# Patient Record
Sex: FEMALE | Race: OTHER | NOT HISPANIC OR LATINO
[De-identification: names, ages, dates, MRNs, and addresses within clinical notes are randomized per-mention and may not be internally consistent; named-entity substitution may affect disease eponyms.]

---

## 2018-08-30 ENCOUNTER — RESULT REVIEW (OUTPATIENT)
Age: 68
End: 2018-08-30

## 2019-06-05 ENCOUNTER — EMERGENCY (EMERGENCY)
Facility: HOSPITAL | Age: 69
LOS: 1 days | Discharge: HOME | End: 2019-06-05
Attending: EMERGENCY MEDICINE
Payer: MEDICARE

## 2019-06-05 VITALS
DIASTOLIC BLOOD PRESSURE: 74 MMHG | SYSTOLIC BLOOD PRESSURE: 125 MMHG | TEMPERATURE: 98 F | RESPIRATION RATE: 16 BRPM | HEART RATE: 60 BPM | OXYGEN SATURATION: 98 %

## 2019-06-05 VITALS
HEIGHT: 60 IN | DIASTOLIC BLOOD PRESSURE: 77 MMHG | WEIGHT: 104.06 LBS | HEART RATE: 75 BPM | SYSTOLIC BLOOD PRESSURE: 123 MMHG | OXYGEN SATURATION: 98 % | TEMPERATURE: 98 F | RESPIRATION RATE: 16 BRPM

## 2019-06-05 LAB
ALBUMIN SERPL ELPH-MCNC: 4.2 G/DL — SIGNIFICANT CHANGE UP (ref 3.3–5)
ALP SERPL-CCNC: 86 U/L — SIGNIFICANT CHANGE UP (ref 40–120)
ALT FLD-CCNC: 36 U/L — SIGNIFICANT CHANGE UP (ref 10–45)
ANION GAP SERPL CALC-SCNC: 11 MMOL/L — SIGNIFICANT CHANGE UP (ref 5–17)
AST SERPL-CCNC: 50 U/L — HIGH (ref 10–40)
BILIRUB SERPL-MCNC: 0.3 MG/DL — SIGNIFICANT CHANGE UP (ref 0.2–1.2)
BUN SERPL-MCNC: 15 MG/DL — SIGNIFICANT CHANGE UP (ref 7–23)
CALCIUM SERPL-MCNC: 9.4 MG/DL — SIGNIFICANT CHANGE UP (ref 8.4–10.5)
CHLORIDE SERPL-SCNC: 103 MMOL/L — SIGNIFICANT CHANGE UP (ref 96–108)
CO2 SERPL-SCNC: 25 MMOL/L — SIGNIFICANT CHANGE UP (ref 22–31)
CREAT SERPL-MCNC: 0.48 MG/DL — LOW (ref 0.5–1.3)
GLUCOSE SERPL-MCNC: 84 MG/DL — SIGNIFICANT CHANGE UP (ref 70–99)
HCT VFR BLD CALC: 35 % — SIGNIFICANT CHANGE UP (ref 34.5–45)
HGB BLD-MCNC: 11.3 G/DL — LOW (ref 11.5–15.5)
LIDOCAIN IGE QN: 38 U/L — SIGNIFICANT CHANGE UP (ref 7–60)
MCHC RBC-ENTMCNC: 29.3 PG — SIGNIFICANT CHANGE UP (ref 27–34)
MCHC RBC-ENTMCNC: 32.3 GM/DL — SIGNIFICANT CHANGE UP (ref 32–36)
MCV RBC AUTO: 90.8 FL — SIGNIFICANT CHANGE UP (ref 80–100)
PLATELET # BLD AUTO: 328 K/UL — SIGNIFICANT CHANGE UP (ref 150–400)
POTASSIUM SERPL-MCNC: 6.5 MMOL/L — CRITICAL HIGH (ref 3.5–5.3)
POTASSIUM SERPL-SCNC: 6.5 MMOL/L — CRITICAL HIGH (ref 3.5–5.3)
PROT SERPL-MCNC: 7.9 G/DL — SIGNIFICANT CHANGE UP (ref 6–8.3)
RBC # BLD: 3.86 M/UL — SIGNIFICANT CHANGE UP (ref 3.8–5.2)
RBC # FLD: 14.3 % — SIGNIFICANT CHANGE UP (ref 10.3–14.5)
SODIUM SERPL-SCNC: 139 MMOL/L — SIGNIFICANT CHANGE UP (ref 135–145)
WBC # BLD: 8.7 K/UL — SIGNIFICANT CHANGE UP (ref 3.8–10.5)
WBC # FLD AUTO: 8.7 K/UL — SIGNIFICANT CHANGE UP (ref 3.8–10.5)

## 2019-06-05 PROCEDURE — 71275 CT ANGIOGRAPHY CHEST: CPT | Mod: 26

## 2019-06-05 PROCEDURE — 96374 THER/PROPH/DIAG INJ IV PUSH: CPT

## 2019-06-05 PROCEDURE — 99284 EMERGENCY DEPT VISIT MOD MDM: CPT | Mod: 25

## 2019-06-05 PROCEDURE — 71045 X-RAY EXAM CHEST 1 VIEW: CPT | Mod: 26

## 2019-06-05 PROCEDURE — 99284 EMERGENCY DEPT VISIT MOD MDM: CPT

## 2019-06-05 PROCEDURE — 72100 X-RAY EXAM L-S SPINE 2/3 VWS: CPT

## 2019-06-05 PROCEDURE — 72100 X-RAY EXAM L-S SPINE 2/3 VWS: CPT | Mod: 26

## 2019-06-05 PROCEDURE — 83690 ASSAY OF LIPASE: CPT

## 2019-06-05 PROCEDURE — 71275 CT ANGIOGRAPHY CHEST: CPT

## 2019-06-05 PROCEDURE — 71045 X-RAY EXAM CHEST 1 VIEW: CPT

## 2019-06-05 PROCEDURE — 74174 CTA ABD&PLVS W/CONTRAST: CPT

## 2019-06-05 PROCEDURE — 74174 CTA ABD&PLVS W/CONTRAST: CPT | Mod: 26

## 2019-06-05 PROCEDURE — 85027 COMPLETE CBC AUTOMATED: CPT

## 2019-06-05 PROCEDURE — 80053 COMPREHEN METABOLIC PANEL: CPT

## 2019-06-05 RX ORDER — OXYCODONE HYDROCHLORIDE 5 MG/1
5 TABLET ORAL ONCE
Refills: 0 | Status: DISCONTINUED | OUTPATIENT
Start: 2019-06-05 | End: 2019-06-05

## 2019-06-05 RX ORDER — OXYCODONE HYDROCHLORIDE 5 MG/1
1 TABLET ORAL
Qty: 12 | Refills: 0
Start: 2019-06-05 | End: 2019-06-08

## 2019-06-05 RX ORDER — ACETAMINOPHEN 500 MG
975 TABLET ORAL ONCE
Refills: 0 | Status: COMPLETED | OUTPATIENT
Start: 2019-06-05 | End: 2019-06-05

## 2019-06-05 RX ORDER — KETOROLAC TROMETHAMINE 30 MG/ML
15 SYRINGE (ML) INJECTION ONCE
Refills: 0 | Status: DISCONTINUED | OUTPATIENT
Start: 2019-06-05 | End: 2019-06-05

## 2019-06-05 RX ADMIN — Medication 975 MILLIGRAM(S): at 16:57

## 2019-06-05 RX ADMIN — Medication 15 MILLIGRAM(S): at 16:56

## 2019-06-05 RX ADMIN — OXYCODONE HYDROCHLORIDE 5 MILLIGRAM(S): 5 TABLET ORAL at 16:56

## 2019-06-05 NOTE — ED PROVIDER NOTE - CLINICAL SUMMARY MEDICAL DECISION MAKING FREE TEXT BOX
4 days of back pain worsening today- feeling better after medicated  requesting to dc home 4 days of back pain worsening today- feeling better after medicated  requesting to dc home    Attending Statement: Agree with the above.  LBP after straining/twisting.  Pain reproducible but TTP at LUQ/?epigastrium? as well as -- and more pronounced at -- L sided lumbar/thoracic paraspinal musculature.  Likely MSK strain, likely QL, given reproducibility of symptoms but given age and comorbidities as well as degree of pain will require labs, UA, and plain films to eval for intra-abdominal/thoracic pathology.  Pain rx.  S/O to Dr. Mann pending w/u and reassess.  --BMM

## 2019-06-05 NOTE — ED PROVIDER NOTE - OBJECTIVE STATEMENT
69 yo female accompanied by family for evaluation  of lower back pain for the past 4 days, worsening today. Pt reports 8/10 dull pain at this time not relieved by her prescribed meloxicam. Pt denies numbness, tingling, weakness, loss of bowel or bladder function or any other concerns. Pt denies chest pains or shortness of breath or palpitations. Pt states "I was cleaning over the weekend and I bent over and hurt my back. I did not fall or have other injuries".

## 2019-06-05 NOTE — ED ADULT NURSE NOTE - OBJECTIVE STATEMENT
68y female arrived to ED complaining of back pain. Patient reports onset of pain began 4 days ago and has worsened since then. Patient takes meloxicam without relief, endorses 10/10 pain on the side of her back upper and lower radiating to her anterior left side. Patient denies injury, burning urination, CP, SOB, n/v/d, ab pain, chills, fever. Patient A&Ox4, ambulates, VS stable. PMHx herniated disc and osteoporosis.

## 2019-06-05 NOTE — ED PROVIDER NOTE - SHIFT CHANGE DETAILS
Received sign-out from Dr. Fagan awaiting labs and XRs in anticipation of DC if unremarkable for any emergent abnormality and patient symptomatically improved. Labs and XRs completed. Labs unremarkable (K 6.5 but hemolyzed c/ a normal creatinine. Not in need of repeating.) Called by Radiology regarding XR finding. CTA ordered as recommended. Will follow the results, reassess and treat/dispo accordingly.

## 2019-06-05 NOTE — ED PROVIDER NOTE - NSFOLLOWUPINSTRUCTIONS_ED_ALL_ED_FT
REst, increase activity as toelrated  -- Please use 650mg Tylenol (also called acetaminophen) every 4 hours & 600mg Motrin (also called Advil or ibuprofen) every 6 hours as needed for pain/discomfort/swelling. You can get these without a prescription. Don't use more than 3500mg of Tylenol in any 24-hour period. Make sure your other prescription/over-the-counter medications don't contain any Tylenol so you don't take too much. If you have any stomach discomfort while taking Motrin, you can use TUMS or Pepcid or Zantac (these can all be bought without a prescription).   take oxycodone for breakthrough pain if other medications are not helping.   Apply over the counter lidocaine patch to back as needed - such as SALONPAS    follow up with the spine center

## 2019-06-05 NOTE — ED PROVIDER NOTE - CONSTITUTIONAL, MLM
FitKit was given to patient on 7/25/2018 11:01 AM          normal... Well appearing, well nourished, awake, alert, oriented to person, place, time/situation and in no apparent distress.

## 2019-06-05 NOTE — ED PROVIDER NOTE - PROGRESS NOTE DETAILS
Pt feeling much better at this time. requesting to dc home - pt to follow up with spine  motrin tylenol and oxycodone to dc home

## 2019-06-05 NOTE — ED PROVIDER NOTE - CARE PLAN
Principal Discharge DX:	Back strain, initial encounter Principal Discharge DX:	Back strain, initial encounter  Goal:	L, lower

## 2019-06-25 ENCOUNTER — APPOINTMENT (OUTPATIENT)
Dept: ORTHOPEDIC SURGERY | Facility: CLINIC | Age: 69
End: 2019-06-25
Payer: MEDICARE

## 2019-06-25 VITALS
HEART RATE: 67 BPM | HEIGHT: 60 IN | WEIGHT: 104 LBS | SYSTOLIC BLOOD PRESSURE: 138 MMHG | BODY MASS INDEX: 20.42 KG/M2 | DIASTOLIC BLOOD PRESSURE: 74 MMHG

## 2019-06-25 DIAGNOSIS — Z78.9 OTHER SPECIFIED HEALTH STATUS: ICD-10-CM

## 2019-06-25 PROCEDURE — 72100 X-RAY EXAM L-S SPINE 2/3 VWS: CPT

## 2019-06-25 PROCEDURE — 99204 OFFICE O/P NEW MOD 45 MIN: CPT

## 2019-07-10 ENCOUNTER — APPOINTMENT (OUTPATIENT)
Dept: NEUROSURGERY | Facility: CLINIC | Age: 69
End: 2019-07-10
Payer: MEDICARE

## 2019-07-10 VITALS
BODY MASS INDEX: 20.42 KG/M2 | HEART RATE: 69 BPM | SYSTOLIC BLOOD PRESSURE: 104 MMHG | HEIGHT: 60 IN | WEIGHT: 104 LBS | DIASTOLIC BLOOD PRESSURE: 62 MMHG

## 2019-07-10 PROCEDURE — 99203 OFFICE O/P NEW LOW 30 MIN: CPT

## 2019-07-10 RX ORDER — MULTIVITAMIN
CAPSULE ORAL
Refills: 0 | Status: ACTIVE | COMMUNITY

## 2019-07-10 RX ORDER — ASPIRIN 81 MG
81 TABLET, DELAYED RELEASE (ENTERIC COATED) ORAL
Refills: 0 | Status: ACTIVE | COMMUNITY

## 2019-07-10 RX ORDER — ATORVASTATIN CALCIUM 10 MG/1
10 TABLET, FILM COATED ORAL DAILY
Refills: 0 | Status: ACTIVE | COMMUNITY

## 2019-07-10 RX ORDER — GABAPENTIN 300 MG/1
300 CAPSULE ORAL 3 TIMES DAILY
Refills: 0 | Status: ACTIVE | COMMUNITY

## 2019-07-10 RX ORDER — MELOXICAM 7.5 MG/1
7.5 TABLET ORAL DAILY
Refills: 0 | Status: ACTIVE | COMMUNITY

## 2019-07-10 RX ORDER — AMLODIPINE BESYLATE 2.5 MG/1
2.5 TABLET ORAL DAILY
Refills: 0 | Status: ACTIVE | COMMUNITY

## 2019-07-10 RX ORDER — ALENDRONATE SODIUM 70 MG/1
70 TABLET ORAL
Refills: 0 | Status: ACTIVE | COMMUNITY

## 2019-07-10 RX ORDER — CHROMIUM 200 MCG
TABLET ORAL
Refills: 0 | Status: ACTIVE | COMMUNITY

## 2019-07-10 NOTE — DATA REVIEWED
[de-identified] : MRI lumbar spine done 2/13/19 showed evidence of L5-S1 herniation with grade 1 spondy and bilateral L5 and S1 imipingement, bilateral L4 impingement from DDD L4-L5

## 2019-07-10 NOTE — ASSESSMENT
[FreeTextEntry1] : 68 year old female with low back and lumbar radicular pain secondary to disc herniation.  Given the nature of the patient's complaints and lack of significant improvement following more conservative measures, we did discuss lumbar epidural steroid injection.  Risks, benefits, and expectations of the procedure were reviewed.  The patient was provided with an educational pamphlet outlining the details of the procedure so that he/she may have the ability to review the information prior to proceeding.  The patient has agreed to proceed and will follow up with me for the procedure.\par

## 2019-07-10 NOTE — HISTORY OF PRESENT ILLNESS
[___ mths] : [unfilled] month(s) ago [Back] : back [8] : a maximum pain level of 8/10 [Sharp] : sharp [Right] : right [Ankle] : ankle [Anterior] : anterior aspect of the [Sitting] : sitting [Numbness] : numbness [Standing] : standing [Laying] : laying [Walking] : walking [Insomnia] : insomnia [Medications] : medications [PT] : PT [Gait Dysfunction] : gait dysfunction [FreeTextEntry2] : right leg [FreeTextEntry6] : Gabapenin, Meloxicam

## 2019-07-10 NOTE — PHYSICAL EXAM
[General Appearance - Alert] : alert [Mood] : the mood was normal [4] : L3 quadriceps 4/5 [Sensation Tactile Decrease] : light touch was intact [5] : S1 ankle flexors 5/5 [2+] : Ankle jerk left 2+ [Straight-Leg Raise Test - Right] : straight leg raise of the right leg was positive [Straight-Leg Raise Test - Left] : straight leg raise of the left leg was positive [Able to toe walk] : the patient was not able to toe walk [Able to heel walk] : the patient was able to heel walk [FreeTextEntry1] : tenderness to palpation over right lumbar paraspinals and right PSIS [No Spinal Tenderness] : no spinal tenderness [] : no rash

## 2019-07-21 ENCOUNTER — TRANSCRIPTION ENCOUNTER (OUTPATIENT)
Age: 69
End: 2019-07-21

## 2019-07-22 ENCOUNTER — APPOINTMENT (OUTPATIENT)
Dept: NEUROSURGERY | Facility: CLINIC | Age: 69
End: 2019-07-22
Payer: MEDICARE

## 2019-07-22 ENCOUNTER — OUTPATIENT (OUTPATIENT)
Dept: OUTPATIENT SERVICES | Facility: HOSPITAL | Age: 69
LOS: 1 days | End: 2019-07-22
Payer: MEDICARE

## 2019-07-22 DIAGNOSIS — M54.16 RADICULOPATHY, LUMBAR REGION: ICD-10-CM

## 2019-07-22 PROCEDURE — 62323 NJX INTERLAMINAR LMBR/SAC: CPT

## 2019-08-07 ENCOUNTER — APPOINTMENT (OUTPATIENT)
Dept: NEUROSURGERY | Facility: CLINIC | Age: 69
End: 2019-08-07

## 2019-11-07 ENCOUNTER — APPOINTMENT (OUTPATIENT)
Dept: ORTHOPEDIC SURGERY | Facility: CLINIC | Age: 69
End: 2019-11-07

## 2019-11-12 ENCOUNTER — APPOINTMENT (OUTPATIENT)
Dept: ORTHOPEDIC SURGERY | Facility: CLINIC | Age: 69
End: 2019-11-12
Payer: MEDICARE

## 2019-11-15 ENCOUNTER — APPOINTMENT (OUTPATIENT)
Dept: ORTHOPEDIC SURGERY | Facility: CLINIC | Age: 69
End: 2019-11-15
Payer: MEDICARE

## 2019-11-15 PROCEDURE — 99214 OFFICE O/P EST MOD 30 MIN: CPT

## 2019-11-22 ENCOUNTER — APPOINTMENT (OUTPATIENT)
Dept: NEUROSURGERY | Facility: CLINIC | Age: 69
End: 2019-11-22
Payer: MEDICARE

## 2019-11-22 VITALS
BODY MASS INDEX: 22.91 KG/M2 | HEIGHT: 55 IN | SYSTOLIC BLOOD PRESSURE: 125 MMHG | HEART RATE: 56 BPM | DIASTOLIC BLOOD PRESSURE: 76 MMHG | WEIGHT: 99 LBS

## 2019-11-22 PROCEDURE — 99213 OFFICE O/P EST LOW 20 MIN: CPT

## 2019-11-22 NOTE — REASON FOR VISIT
[Follow-Up: _____] : a [unfilled] follow-up visit [FreeTextEntry1] : Patient returns after having LESI in July.  She states it did help her a little.  She has seen Dr. Bennett who suggested a repeat injection.  She is here to discuss.

## 2019-11-22 NOTE — ASSESSMENT
[FreeTextEntry1] : 69 year old female with low back and lumbar radicular pain only minimally improved following her first injection in July.  She is agreeable to a repeat injection and will follow up with me next week for her procedure.

## 2019-12-01 ENCOUNTER — TRANSCRIPTION ENCOUNTER (OUTPATIENT)
Age: 69
End: 2019-12-01

## 2019-12-02 ENCOUNTER — OUTPATIENT (OUTPATIENT)
Dept: OUTPATIENT SERVICES | Facility: HOSPITAL | Age: 69
LOS: 1 days | End: 2019-12-02
Payer: MEDICARE

## 2019-12-02 ENCOUNTER — APPOINTMENT (OUTPATIENT)
Dept: NEUROSURGERY | Facility: CLINIC | Age: 69
End: 2019-12-02
Payer: MEDICARE

## 2019-12-02 DIAGNOSIS — M54.16 RADICULOPATHY, LUMBAR REGION: ICD-10-CM

## 2019-12-02 PROCEDURE — 62323 NJX INTERLAMINAR LMBR/SAC: CPT

## 2019-12-18 ENCOUNTER — APPOINTMENT (OUTPATIENT)
Dept: NEUROSURGERY | Facility: CLINIC | Age: 69
End: 2019-12-18
Payer: MEDICARE

## 2019-12-18 VITALS
HEIGHT: 55 IN | DIASTOLIC BLOOD PRESSURE: 68 MMHG | SYSTOLIC BLOOD PRESSURE: 119 MMHG | HEART RATE: 61 BPM | WEIGHT: 97 LBS | BODY MASS INDEX: 22.45 KG/M2

## 2019-12-18 PROCEDURE — 99213 OFFICE O/P EST LOW 20 MIN: CPT

## 2019-12-18 NOTE — ASSESSMENT
[FreeTextEntry1] : 69 year old female with low back and lumbar radicular pain now moderately improved following her second LESI this year.  She is agreeable to a third and final injection in this series and will follow up with me in the next couple of weeks for her procedure.

## 2019-12-18 NOTE — REASON FOR VISIT
[Follow-Up: _____] : a [unfilled] follow-up visit [FreeTextEntry1] : Patient returns after her second LESI a few weeks ago.  She states that she feels about 50% improved as compared to prior to her procedure.  She is here to discuss her progress.

## 2020-01-05 ENCOUNTER — TRANSCRIPTION ENCOUNTER (OUTPATIENT)
Age: 70
End: 2020-01-05

## 2020-01-06 ENCOUNTER — APPOINTMENT (OUTPATIENT)
Dept: NEUROSURGERY | Facility: CLINIC | Age: 70
End: 2020-01-06
Payer: MEDICARE

## 2020-01-06 ENCOUNTER — OUTPATIENT (OUTPATIENT)
Dept: OUTPATIENT SERVICES | Facility: HOSPITAL | Age: 70
LOS: 1 days | End: 2020-01-06
Payer: MEDICARE

## 2020-01-06 DIAGNOSIS — M54.16 RADICULOPATHY, LUMBAR REGION: ICD-10-CM

## 2020-01-06 PROCEDURE — 62323 NJX INTERLAMINAR LMBR/SAC: CPT

## 2020-01-29 ENCOUNTER — APPOINTMENT (OUTPATIENT)
Dept: NEUROSURGERY | Facility: CLINIC | Age: 70
End: 2020-01-29
Payer: MEDICARE

## 2020-01-29 VITALS
DIASTOLIC BLOOD PRESSURE: 84 MMHG | HEART RATE: 68 BPM | HEIGHT: 55 IN | BODY MASS INDEX: 22.68 KG/M2 | WEIGHT: 98 LBS | SYSTOLIC BLOOD PRESSURE: 126 MMHG

## 2020-01-29 PROCEDURE — 99213 OFFICE O/P EST LOW 20 MIN: CPT

## 2020-01-29 NOTE — ASSESSMENT
[FreeTextEntry1] : 69 year old female with low back and lumbar radicular pain now improved following a series of 3 LESIs.  She will continue with her home exercises and return to see me at the earliest sign that her pain worsens for further injection therapy as necessary.

## 2020-01-29 NOTE — REASON FOR VISIT
[Follow-Up: _____] : a [unfilled] follow-up visit [FreeTextEntry1] : Patient returns after her third and final epidural in this series.  She feels an overall 60% improved following this procedure.  Her current pain intensity is 4/10.  She takes Gabapentin and Meloxicam for pain relief.  She is here to discuss her progress.

## 2020-03-16 ENCOUNTER — INPATIENT (INPATIENT)
Facility: HOSPITAL | Age: 70
LOS: 0 days | Discharge: HOME | DRG: 312 | End: 2020-03-17
Attending: HOSPITALIST | Admitting: HOSPITALIST
Payer: COMMERCIAL

## 2020-03-16 VITALS
SYSTOLIC BLOOD PRESSURE: 123 MMHG | WEIGHT: 110.01 LBS | DIASTOLIC BLOOD PRESSURE: 70 MMHG | HEART RATE: 60 BPM | RESPIRATION RATE: 15 BRPM | TEMPERATURE: 99 F | OXYGEN SATURATION: 99 % | HEIGHT: 57 IN

## 2020-03-16 LAB
ALBUMIN SERPL ELPH-MCNC: 4.2 G/DL — SIGNIFICANT CHANGE UP (ref 3.3–5)
ALP SERPL-CCNC: 65 U/L — SIGNIFICANT CHANGE UP (ref 40–120)
ALT FLD-CCNC: 16 U/L — SIGNIFICANT CHANGE UP (ref 10–45)
ANION GAP SERPL CALC-SCNC: 15 MMOL/L — SIGNIFICANT CHANGE UP (ref 5–17)
APTT BLD: 30 SEC — SIGNIFICANT CHANGE UP (ref 27.5–36.3)
AST SERPL-CCNC: 27 U/L — SIGNIFICANT CHANGE UP (ref 10–40)
BASOPHILS # BLD AUTO: 0.02 K/UL — SIGNIFICANT CHANGE UP (ref 0–0.2)
BASOPHILS NFR BLD AUTO: 0.3 % — SIGNIFICANT CHANGE UP (ref 0–2)
BILIRUB SERPL-MCNC: 0.3 MG/DL — SIGNIFICANT CHANGE UP (ref 0.2–1.2)
BUN SERPL-MCNC: 11 MG/DL — SIGNIFICANT CHANGE UP (ref 7–23)
CALCIUM SERPL-MCNC: 9.3 MG/DL — SIGNIFICANT CHANGE UP (ref 8.4–10.5)
CHLORIDE SERPL-SCNC: 102 MMOL/L — SIGNIFICANT CHANGE UP (ref 96–108)
CO2 SERPL-SCNC: 24 MMOL/L — SIGNIFICANT CHANGE UP (ref 22–31)
CREAT SERPL-MCNC: 0.43 MG/DL — LOW (ref 0.5–1.3)
EOSINOPHIL # BLD AUTO: 0.1 K/UL — SIGNIFICANT CHANGE UP (ref 0–0.5)
EOSINOPHIL NFR BLD AUTO: 1.4 % — SIGNIFICANT CHANGE UP (ref 0–6)
GLUCOSE SERPL-MCNC: 87 MG/DL — SIGNIFICANT CHANGE UP (ref 70–99)
HCT VFR BLD CALC: 38.5 % — SIGNIFICANT CHANGE UP (ref 34.5–45)
HGB BLD-MCNC: 11.8 G/DL — SIGNIFICANT CHANGE UP (ref 11.5–15.5)
IMM GRANULOCYTES NFR BLD AUTO: 0.4 % — SIGNIFICANT CHANGE UP (ref 0–1.5)
INR BLD: 0.96 RATIO — SIGNIFICANT CHANGE UP (ref 0.88–1.16)
LYMPHOCYTES # BLD AUTO: 1.52 K/UL — SIGNIFICANT CHANGE UP (ref 1–3.3)
LYMPHOCYTES # BLD AUTO: 21.3 % — SIGNIFICANT CHANGE UP (ref 13–44)
MAGNESIUM SERPL-MCNC: 1.9 MG/DL — SIGNIFICANT CHANGE UP (ref 1.6–2.6)
MCHC RBC-ENTMCNC: 28.8 PG — SIGNIFICANT CHANGE UP (ref 27–34)
MCHC RBC-ENTMCNC: 30.6 GM/DL — LOW (ref 32–36)
MCV RBC AUTO: 93.9 FL — SIGNIFICANT CHANGE UP (ref 80–100)
MONOCYTES # BLD AUTO: 0.91 K/UL — HIGH (ref 0–0.9)
MONOCYTES NFR BLD AUTO: 12.7 % — SIGNIFICANT CHANGE UP (ref 2–14)
NEUTROPHILS # BLD AUTO: 4.56 K/UL — SIGNIFICANT CHANGE UP (ref 1.8–7.4)
NEUTROPHILS NFR BLD AUTO: 63.9 % — SIGNIFICANT CHANGE UP (ref 43–77)
NRBC # BLD: 0 /100 WBCS — SIGNIFICANT CHANGE UP (ref 0–0)
PHOSPHATE SERPL-MCNC: 4.3 MG/DL — SIGNIFICANT CHANGE UP (ref 2.5–4.5)
PLATELET # BLD AUTO: 292 K/UL — SIGNIFICANT CHANGE UP (ref 150–400)
POTASSIUM SERPL-MCNC: 4.6 MMOL/L — SIGNIFICANT CHANGE UP (ref 3.5–5.3)
POTASSIUM SERPL-SCNC: 4.6 MMOL/L — SIGNIFICANT CHANGE UP (ref 3.5–5.3)
PROT SERPL-MCNC: 8 G/DL — SIGNIFICANT CHANGE UP (ref 6–8.3)
PROTHROM AB SERPL-ACNC: 11 SEC — SIGNIFICANT CHANGE UP (ref 10–12.9)
RBC # BLD: 4.1 M/UL — SIGNIFICANT CHANGE UP (ref 3.8–5.2)
RBC # FLD: 14.1 % — SIGNIFICANT CHANGE UP (ref 10.3–14.5)
SODIUM SERPL-SCNC: 141 MMOL/L — SIGNIFICANT CHANGE UP (ref 135–145)
TROPONIN T, HIGH SENSITIVITY RESULT: 8 NG/L — SIGNIFICANT CHANGE UP (ref 0–51)
TROPONIN T, HIGH SENSITIVITY RESULT: 8 NG/L — SIGNIFICANT CHANGE UP (ref 0–51)
WBC # BLD: 7.14 K/UL — SIGNIFICANT CHANGE UP (ref 3.8–10.5)
WBC # FLD AUTO: 7.14 K/UL — SIGNIFICANT CHANGE UP (ref 3.8–10.5)

## 2020-03-16 PROCEDURE — 99220: CPT

## 2020-03-16 RX ORDER — ACETAMINOPHEN 500 MG
975 TABLET ORAL ONCE
Refills: 0 | Status: COMPLETED | OUTPATIENT
Start: 2020-03-16 | End: 2020-03-16

## 2020-03-16 RX ORDER — SODIUM CHLORIDE 9 MG/ML
3 INJECTION INTRAMUSCULAR; INTRAVENOUS; SUBCUTANEOUS EVERY 8 HOURS
Refills: 0 | Status: DISCONTINUED | OUTPATIENT
Start: 2020-03-16 | End: 2020-03-17

## 2020-03-16 RX ORDER — MECLIZINE HCL 12.5 MG
50 TABLET ORAL ONCE
Refills: 0 | Status: COMPLETED | OUTPATIENT
Start: 2020-03-16 | End: 2020-03-16

## 2020-03-16 RX ADMIN — Medication 50 MILLIGRAM(S): at 18:01

## 2020-03-16 RX ADMIN — Medication 975 MILLIGRAM(S): at 18:01

## 2020-03-16 NOTE — ED CDU PROVIDER INITIAL DAY NOTE - DETAILS
68 y/o female with no reported PMHx no presented to the ED c/o dizziness.  In ED, patient had CT head w/o contrast, CT angio head and neck showing no signs of acute pathology. Pt was evaluated by Neurology who recommended CDU for MRI brain. If MRI brain positive, can discharge on ASA/Plavix for 3 weeks followed by ASA alone. If negative, no further workup.

## 2020-03-16 NOTE — ED CDU PROVIDER INITIAL DAY NOTE - PROGRESS NOTE DETAILS
CDU PROGRESS NOTE PA JOSUÉ: Pt resting comfortably, NAD. No focal neuro deficits. (+) Orthostatic BP changes. c/d/w Dr. Mooney, will give bolus 1liter NS and closely follow.

## 2020-03-16 NOTE — CONSULT NOTE ADULT - ASSESSMENT
Impression: Isolated vertigo, likely peripheral. r/o CVA.     Plan:   [] CDU for MRI brain   [] If MRI brain positive, can discharge on ASA/Plavix for 3 weeks followed by ASA alone. If negative, no further workup.   [] F/u with Dr. Serjio Arnold at [phone] Impression: Isolated vertigo, likely peripheral. r/o CVA.     Plan:   [] CDU for MRI brain   [] If MRI brain positive for acute infarct, can discharge on ASA/Plavix for 3 weeks followed by ASA alone. Also can add atorvastatin 80mg. If negative, no need for antiplatelet or statin therapy.   [] F/u with Dr. Serjio Arnold at [phone] Impression: Isolated vertigo, likely peripheral. r/o CVA.     Plan:   [] CDU for MRI brain   [x] CTH, CTA h/n negative   [] If MRI brain positive for acute infarct, can discharge on ASA/Plavix for 3 weeks followed by ASA alone. Also can add atorvastatin 80mg. If negative, no need for antiplatelet or statin therapy.   [] F/u with Dr. Serjio Arnold at [phone]

## 2020-03-16 NOTE — CONSULT NOTE ADULT - SUBJECTIVE AND OBJECTIVE BOX
HPI:    69F w/ PMH presents w/ dizziness and room-spinning sensation since yesterday.     Patient states she got up to walk to her kitchen yesterday when she started feeling dizzy, stating she felt like the room was spinning and then lightheaded. This lead to a fall. No LOC. did not hit head. The dizziness has persisted. Continuos. Worse w/ sudden head movements. Denies changes in speech, vision, hearing, swallowing, voice quality, numbness/tingling, weakness, balance/room-spinning sensations. No previous history of CVA/TIA, seizures, migraines. No history of arrythmia. No AC.      (Stroke only)  NIHSS: 0    CTH: Negative  CTA h/n: Negative    A 10-system ROS was performed and is negative except for those items noted above and/or in the HPI.    PAST MEDICAL & SURGICAL HISTORY:  Diarrhea  Nausea & vomiting  Pancolitis  No significant past surgical history    FAMILY HISTORY:    SOCIAL HISTORY:   T/E/D: No smoke, drink, drugs     MEDICATIONS (HOME):  Home Medications:    Exam:   HIINT: No corrective saccade. No gaze evoked nystagmus. No skew.   MS: Alert and oriented to self, place and time. Attentive. Language fluent w/ intact comprehension and repetition. No extinction on double simultaneous stimulation   CN: VFF. GALDINO. EOMI. V1-V3 intact. Face symmetric. T/u midline. Normal shrug.   Motor: Normal tone. No drift. Full strength throughout  Reflexes: 3+ throughout. Babinski presents on the right, absent on the left.   Sensation: Intact to LT throughout   Coordination: No dysmetria on FNF or H2S bilaterally   Gait: Deferred     STUDIES & IMAGIN- Na 141  - K 4.6  -16 P 4.3  -16 Mg 1.9  -16 Ca 9.3  -16 Cr 0.43<L>

## 2020-03-16 NOTE — ED ADULT NURSE NOTE - OBJECTIVE STATEMENT
Pt presents to ED following a fall yesterday at home, AXOX3, pt reports walking to kitchen at home to get something to eat, and falling to floor, pt unsure how long she was down, reports

## 2020-03-16 NOTE — ED PROVIDER NOTE - OBJECTIVE STATEMENT
68 y/o female with no reported PMHx no PSx now presenting to the ED c/o dizziness with room spinning x1 day s/p unwitnessed syncopal event yesterday. Patient reported she syncopized onto a carpeted floor yesterday for unknown period of time. Patient has never syncopized in the past. Since the event pt has had diffuse headache and has not taken medication for pain as of yet. Patient feels unsteady on her feet requesting assistance to ambulate which is not her baseline. Patient denied CP, SOB, N/V/D, diarrhea, fever, cough, COVID sick contacts, abdominal pain, slurred speech, numbness, urinary/fecal incontinence, facial droop, weakness in extremity, previous h/o stroke, blurry vision

## 2020-03-16 NOTE — ED PROVIDER NOTE - ATTENDING CONTRIBUTION TO CARE
68 yo female p/w syncope yesterday, dizziness today.  Denies fever, SOB, URI sx.  No respiratory distress on exam.  Will check labs, IVF, CT head/c-spine eval for posterior fossa abnormality, check cardiac enzymes and reassess.

## 2020-03-16 NOTE — ED PROVIDER NOTE - PROGRESS NOTE DETAILS
KEVIN Velez: patient continues to be symptomatic requiring assistance to ambulate. paged neurology awaiting call back. CTs negative. will likely CDU for stroke and syncope w/u

## 2020-03-16 NOTE — ED CDU PROVIDER INITIAL DAY NOTE - OBJECTIVE STATEMENT
68 y/o female with no reported PMHx no PSx now presenting to the ED c/o dizziness with room spinning x1 day s/p unwitnessed syncopal event yesterday. Patient reported she syncopized onto a carpeted floor yesterday for unknown period of time. Patient has never syncopized in the past. Since the event pt has had diffuse headache and has not taken medication for pain as of yet. Patient feels unsteady on her feet requesting assistance to ambulate which is not her baseline. Patient denied CP, SOB, N/V/D, diarrhea, fever, cough, COVID sick contacts, abdominal pain, slurred speech, numbness, urinary/fecal incontinence, facial droop, weakness in extremity, previous h/o stroke, blurry vision 68 y/o female with no reported PMHx no PSx now presenting to the ED c/o dizziness with room spinning x1 day s/p unwitnessed syncopal event yesterday. Patient reported she syncopized onto a carpeted floor yesterday for unknown period of time. Patient has never syncopized in the past. Since the event pt has had diffuse headache and has not taken medication for pain as of yet. Pt also reports having 2 days of dry non productive cough and felt chills last night. Patient feels unsteady on her feet requesting assistance to ambulate which is not her baseline. Patient denied CP, SOB, N/V/D, diarrhea, fever, COVID sick contacts, recent travel, abdominal pain, slurred speech, numbness, urinary/fecal incontinence, facial droop, weakness in extremity, previous h/o stroke, blurry vision

## 2020-03-17 ENCOUNTER — TRANSCRIPTION ENCOUNTER (OUTPATIENT)
Age: 70
End: 2020-03-17

## 2020-03-17 VITALS
DIASTOLIC BLOOD PRESSURE: 63 MMHG | HEART RATE: 66 BPM | RESPIRATION RATE: 18 BRPM | SYSTOLIC BLOOD PRESSURE: 103 MMHG | TEMPERATURE: 99 F | OXYGEN SATURATION: 96 %

## 2020-03-17 DIAGNOSIS — J98.11 ATELECTASIS: ICD-10-CM

## 2020-03-17 DIAGNOSIS — R42 DIZZINESS AND GIDDINESS: ICD-10-CM

## 2020-03-17 DIAGNOSIS — R05 COUGH: ICD-10-CM

## 2020-03-17 DIAGNOSIS — Z02.9 ENCOUNTER FOR ADMINISTRATIVE EXAMINATIONS, UNSPECIFIED: ICD-10-CM

## 2020-03-17 DIAGNOSIS — Z29.9 ENCOUNTER FOR PROPHYLACTIC MEASURES, UNSPECIFIED: ICD-10-CM

## 2020-03-17 DIAGNOSIS — R50.9 FEVER, UNSPECIFIED: ICD-10-CM

## 2020-03-17 LAB
ALBUMIN SERPL ELPH-MCNC: 3.7 G/DL — SIGNIFICANT CHANGE UP (ref 3.3–5)
ALP SERPL-CCNC: 55 U/L — SIGNIFICANT CHANGE UP (ref 40–120)
ALT FLD-CCNC: 13 U/L — SIGNIFICANT CHANGE UP (ref 10–45)
ANION GAP SERPL CALC-SCNC: 12 MMOL/L — SIGNIFICANT CHANGE UP (ref 5–17)
APPEARANCE UR: CLEAR — SIGNIFICANT CHANGE UP
AST SERPL-CCNC: 17 U/L — SIGNIFICANT CHANGE UP (ref 10–40)
B PERT DNA SPEC QL NAA+PROBE: SIGNIFICANT CHANGE UP
BACTERIA # UR AUTO: NEGATIVE — SIGNIFICANT CHANGE UP
BASOPHILS # BLD AUTO: 0.02 K/UL — SIGNIFICANT CHANGE UP (ref 0–0.2)
BASOPHILS NFR BLD AUTO: 0.4 % — SIGNIFICANT CHANGE UP (ref 0–2)
BILIRUB SERPL-MCNC: 0.3 MG/DL — SIGNIFICANT CHANGE UP (ref 0.2–1.2)
BILIRUB UR-MCNC: NEGATIVE — SIGNIFICANT CHANGE UP
BUN SERPL-MCNC: 6 MG/DL — LOW (ref 7–23)
C PNEUM DNA SPEC QL NAA+PROBE: SIGNIFICANT CHANGE UP
CALCIUM SERPL-MCNC: 8.7 MG/DL — SIGNIFICANT CHANGE UP (ref 8.4–10.5)
CHLORIDE SERPL-SCNC: 104 MMOL/L — SIGNIFICANT CHANGE UP (ref 96–108)
CHOLEST SERPL-MCNC: 135 MG/DL — SIGNIFICANT CHANGE UP (ref 10–199)
CO2 SERPL-SCNC: 26 MMOL/L — SIGNIFICANT CHANGE UP (ref 22–31)
COLOR SPEC: SIGNIFICANT CHANGE UP
CREAT SERPL-MCNC: 0.49 MG/DL — LOW (ref 0.5–1.3)
DIFF PNL FLD: NEGATIVE — SIGNIFICANT CHANGE UP
EOSINOPHIL # BLD AUTO: 0.04 K/UL — SIGNIFICANT CHANGE UP (ref 0–0.5)
EOSINOPHIL NFR BLD AUTO: 0.8 % — SIGNIFICANT CHANGE UP (ref 0–6)
EPI CELLS # UR: 0 /HPF — SIGNIFICANT CHANGE UP
FLUAV H1 2009 PAND RNA SPEC QL NAA+PROBE: SIGNIFICANT CHANGE UP
FLUAV H1 RNA SPEC QL NAA+PROBE: SIGNIFICANT CHANGE UP
FLUAV H3 RNA SPEC QL NAA+PROBE: SIGNIFICANT CHANGE UP
FLUAV SUBTYP SPEC NAA+PROBE: SIGNIFICANT CHANGE UP
FLUBV RNA SPEC QL NAA+PROBE: SIGNIFICANT CHANGE UP
GLUCOSE SERPL-MCNC: 95 MG/DL — SIGNIFICANT CHANGE UP (ref 70–99)
GLUCOSE UR QL: NEGATIVE — SIGNIFICANT CHANGE UP
HADV DNA SPEC QL NAA+PROBE: SIGNIFICANT CHANGE UP
HBA1C BLD-MCNC: 5.3 % — SIGNIFICANT CHANGE UP (ref 4–5.6)
HCOV PNL SPEC NAA+PROBE: SIGNIFICANT CHANGE UP
HCT VFR BLD CALC: 37 % — SIGNIFICANT CHANGE UP (ref 34.5–45)
HDLC SERPL-MCNC: 49 MG/DL — LOW
HGB BLD-MCNC: 11.2 G/DL — LOW (ref 11.5–15.5)
HMPV RNA SPEC QL NAA+PROBE: SIGNIFICANT CHANGE UP
HPIV1 RNA SPEC QL NAA+PROBE: SIGNIFICANT CHANGE UP
HPIV2 RNA SPEC QL NAA+PROBE: SIGNIFICANT CHANGE UP
HPIV3 RNA SPEC QL NAA+PROBE: SIGNIFICANT CHANGE UP
HPIV4 RNA SPEC QL NAA+PROBE: SIGNIFICANT CHANGE UP
HYALINE CASTS # UR AUTO: 0 /LPF — SIGNIFICANT CHANGE UP (ref 0–2)
IMM GRANULOCYTES NFR BLD AUTO: 0.2 % — SIGNIFICANT CHANGE UP (ref 0–1.5)
KETONES UR-MCNC: NEGATIVE — SIGNIFICANT CHANGE UP
LEUKOCYTE ESTERASE UR-ACNC: NEGATIVE — SIGNIFICANT CHANGE UP
LIPID PNL WITH DIRECT LDL SERPL: 69 MG/DL — SIGNIFICANT CHANGE UP
LYMPHOCYTES # BLD AUTO: 1.37 K/UL — SIGNIFICANT CHANGE UP (ref 1–3.3)
LYMPHOCYTES # BLD AUTO: 27.7 % — SIGNIFICANT CHANGE UP (ref 13–44)
MAGNESIUM SERPL-MCNC: 1.6 MG/DL — SIGNIFICANT CHANGE UP (ref 1.6–2.6)
MCHC RBC-ENTMCNC: 28.3 PG — SIGNIFICANT CHANGE UP (ref 27–34)
MCHC RBC-ENTMCNC: 30.3 GM/DL — LOW (ref 32–36)
MCV RBC AUTO: 93.4 FL — SIGNIFICANT CHANGE UP (ref 80–100)
MONOCYTES # BLD AUTO: 0.61 K/UL — SIGNIFICANT CHANGE UP (ref 0–0.9)
MONOCYTES NFR BLD AUTO: 12.3 % — SIGNIFICANT CHANGE UP (ref 2–14)
NEUTROPHILS # BLD AUTO: 2.89 K/UL — SIGNIFICANT CHANGE UP (ref 1.8–7.4)
NEUTROPHILS NFR BLD AUTO: 58.6 % — SIGNIFICANT CHANGE UP (ref 43–77)
NITRITE UR-MCNC: NEGATIVE — SIGNIFICANT CHANGE UP
NRBC # BLD: 0 /100 WBCS — SIGNIFICANT CHANGE UP (ref 0–0)
PH UR: 6 — SIGNIFICANT CHANGE UP (ref 5–8)
PLATELET # BLD AUTO: 261 K/UL — SIGNIFICANT CHANGE UP (ref 150–400)
POTASSIUM SERPL-MCNC: 3.7 MMOL/L — SIGNIFICANT CHANGE UP (ref 3.5–5.3)
POTASSIUM SERPL-SCNC: 3.7 MMOL/L — SIGNIFICANT CHANGE UP (ref 3.5–5.3)
PROT SERPL-MCNC: 7.2 G/DL — SIGNIFICANT CHANGE UP (ref 6–8.3)
PROT UR-MCNC: NEGATIVE — SIGNIFICANT CHANGE UP
RAPID RVP RESULT: SIGNIFICANT CHANGE UP
RBC # BLD: 3.96 M/UL — SIGNIFICANT CHANGE UP (ref 3.8–5.2)
RBC # FLD: 14.2 % — SIGNIFICANT CHANGE UP (ref 10.3–14.5)
RBC CASTS # UR COMP ASSIST: 1 /HPF — SIGNIFICANT CHANGE UP (ref 0–4)
RSV RNA SPEC QL NAA+PROBE: SIGNIFICANT CHANGE UP
RV+EV RNA SPEC QL NAA+PROBE: SIGNIFICANT CHANGE UP
SODIUM SERPL-SCNC: 142 MMOL/L — SIGNIFICANT CHANGE UP (ref 135–145)
SP GR SPEC: 1.03 — HIGH (ref 1.01–1.02)
TOTAL CHOLESTEROL/HDL RATIO MEASUREMENT: 2.8 RATIO — LOW (ref 3.3–7.1)
TRIGL SERPL-MCNC: 87 MG/DL — SIGNIFICANT CHANGE UP (ref 10–149)
UROBILINOGEN FLD QL: NEGATIVE — SIGNIFICANT CHANGE UP
WBC # BLD: 4.94 K/UL — SIGNIFICANT CHANGE UP (ref 3.8–10.5)
WBC # FLD AUTO: 4.94 K/UL — SIGNIFICANT CHANGE UP (ref 3.8–10.5)
WBC UR QL: 1 /HPF — SIGNIFICANT CHANGE UP (ref 0–5)

## 2020-03-17 PROCEDURE — 85730 THROMBOPLASTIN TIME PARTIAL: CPT

## 2020-03-17 PROCEDURE — 71250 CT THORAX DX C-: CPT | Mod: 26

## 2020-03-17 PROCEDURE — 85027 COMPLETE CBC AUTOMATED: CPT

## 2020-03-17 PROCEDURE — 84100 ASSAY OF PHOSPHORUS: CPT

## 2020-03-17 PROCEDURE — 94640 AIRWAY INHALATION TREATMENT: CPT

## 2020-03-17 PROCEDURE — 71250 CT THORAX DX C-: CPT

## 2020-03-17 PROCEDURE — 70498 CT ANGIOGRAPHY NECK: CPT

## 2020-03-17 PROCEDURE — 84484 ASSAY OF TROPONIN QUANT: CPT

## 2020-03-17 PROCEDURE — 93005 ELECTROCARDIOGRAM TRACING: CPT

## 2020-03-17 PROCEDURE — 70551 MRI BRAIN STEM W/O DYE: CPT

## 2020-03-17 PROCEDURE — 87798 DETECT AGENT NOS DNA AMP: CPT

## 2020-03-17 PROCEDURE — 83036 HEMOGLOBIN GLYCOSYLATED A1C: CPT

## 2020-03-17 PROCEDURE — 71046 X-RAY EXAM CHEST 2 VIEWS: CPT

## 2020-03-17 PROCEDURE — 87633 RESP VIRUS 12-25 TARGETS: CPT

## 2020-03-17 PROCEDURE — 99217: CPT

## 2020-03-17 PROCEDURE — 99285 EMERGENCY DEPT VISIT HI MDM: CPT | Mod: 25

## 2020-03-17 PROCEDURE — 87486 CHLMYD PNEUM DNA AMP PROBE: CPT

## 2020-03-17 PROCEDURE — 87581 M.PNEUMON DNA AMP PROBE: CPT

## 2020-03-17 PROCEDURE — 81001 URINALYSIS AUTO W/SCOPE: CPT

## 2020-03-17 PROCEDURE — G0378: CPT

## 2020-03-17 PROCEDURE — 93306 TTE W/DOPPLER COMPLETE: CPT

## 2020-03-17 PROCEDURE — 70496 CT ANGIOGRAPHY HEAD: CPT

## 2020-03-17 PROCEDURE — 80053 COMPREHEN METABOLIC PANEL: CPT

## 2020-03-17 PROCEDURE — 80061 LIPID PANEL: CPT

## 2020-03-17 PROCEDURE — 99223 1ST HOSP IP/OBS HIGH 75: CPT

## 2020-03-17 PROCEDURE — 70450 CT HEAD/BRAIN W/O DYE: CPT

## 2020-03-17 PROCEDURE — 93306 TTE W/DOPPLER COMPLETE: CPT | Mod: 26

## 2020-03-17 PROCEDURE — 70551 MRI BRAIN STEM W/O DYE: CPT | Mod: 26

## 2020-03-17 PROCEDURE — 85610 PROTHROMBIN TIME: CPT

## 2020-03-17 PROCEDURE — 83735 ASSAY OF MAGNESIUM: CPT

## 2020-03-17 RX ORDER — ACETAMINOPHEN 500 MG
650 TABLET ORAL EVERY 6 HOURS
Refills: 0 | Status: DISCONTINUED | OUTPATIENT
Start: 2020-03-17 | End: 2020-03-17

## 2020-03-17 RX ORDER — FLUTICASONE PROPIONATE 50 MCG
1 SPRAY, SUSPENSION NASAL
Qty: 1 | Refills: 0
Start: 2020-03-17 | End: 2020-04-15

## 2020-03-17 RX ORDER — ALBUTEROL 90 UG/1
1 AEROSOL, METERED ORAL
Qty: 1 | Refills: 0
Start: 2020-03-17 | End: 2020-04-15

## 2020-03-17 RX ORDER — ACETAMINOPHEN 500 MG
975 TABLET ORAL ONCE
Refills: 0 | Status: COMPLETED | OUTPATIENT
Start: 2020-03-17 | End: 2020-03-17

## 2020-03-17 RX ORDER — ALBUTEROL 90 UG/1
1 AEROSOL, METERED ORAL EVERY 6 HOURS
Refills: 0 | Status: DISCONTINUED | OUTPATIENT
Start: 2020-03-17 | End: 2020-03-17

## 2020-03-17 RX ORDER — SODIUM CHLORIDE 9 MG/ML
1000 INJECTION, SOLUTION INTRAVENOUS ONCE
Refills: 0 | Status: COMPLETED | OUTPATIENT
Start: 2020-03-17 | End: 2020-03-17

## 2020-03-17 RX ORDER — ATORVASTATIN CALCIUM 80 MG/1
20 TABLET, FILM COATED ORAL AT BEDTIME
Refills: 0 | Status: DISCONTINUED | OUTPATIENT
Start: 2020-03-17 | End: 2020-03-17

## 2020-03-17 RX ORDER — ENOXAPARIN SODIUM 100 MG/ML
40 INJECTION SUBCUTANEOUS DAILY
Refills: 0 | Status: DISCONTINUED | OUTPATIENT
Start: 2020-03-18 | End: 2020-03-17

## 2020-03-17 RX ORDER — MECLIZINE HCL 12.5 MG
12.5 TABLET ORAL EVERY 6 HOURS
Refills: 0 | Status: DISCONTINUED | OUTPATIENT
Start: 2020-03-17 | End: 2020-03-17

## 2020-03-17 RX ORDER — INFLUENZA VIRUS VACCINE 15; 15; 15; 15 UG/.5ML; UG/.5ML; UG/.5ML; UG/.5ML
0.5 SUSPENSION INTRAMUSCULAR ONCE
Refills: 0 | Status: DISCONTINUED | OUTPATIENT
Start: 2020-03-17 | End: 2020-03-17

## 2020-03-17 RX ORDER — SODIUM CHLORIDE 9 MG/ML
1000 INJECTION INTRAMUSCULAR; INTRAVENOUS; SUBCUTANEOUS ONCE
Refills: 0 | Status: COMPLETED | OUTPATIENT
Start: 2020-03-17 | End: 2020-03-17

## 2020-03-17 RX ORDER — MECLIZINE HCL 12.5 MG
1 TABLET ORAL
Qty: 30 | Refills: 0
Start: 2020-03-17 | End: 2020-03-26

## 2020-03-17 RX ORDER — ASPIRIN/CALCIUM CARB/MAGNESIUM 324 MG
81 TABLET ORAL DAILY
Refills: 0 | Status: DISCONTINUED | OUTPATIENT
Start: 2020-03-17 | End: 2020-03-17

## 2020-03-17 RX ORDER — FLUTICASONE PROPIONATE 50 MCG
1 SPRAY, SUSPENSION NASAL
Refills: 0 | Status: DISCONTINUED | OUTPATIENT
Start: 2020-03-17 | End: 2020-03-17

## 2020-03-17 RX ADMIN — Medication 81 MILLIGRAM(S): at 11:33

## 2020-03-17 RX ADMIN — Medication 1 TABLET(S): at 11:35

## 2020-03-17 RX ADMIN — Medication 1 SPRAY(S): at 13:16

## 2020-03-17 RX ADMIN — SODIUM CHLORIDE 1000 MILLILITER(S): 9 INJECTION, SOLUTION INTRAVENOUS at 06:10

## 2020-03-17 RX ADMIN — Medication 650 MILLIGRAM(S): at 11:35

## 2020-03-17 RX ADMIN — Medication 100 MILLIGRAM(S): at 11:33

## 2020-03-17 RX ADMIN — Medication 975 MILLIGRAM(S): at 04:54

## 2020-03-17 RX ADMIN — SODIUM CHLORIDE 1000 MILLILITER(S): 9 INJECTION INTRAMUSCULAR; INTRAVENOUS; SUBCUTANEOUS at 01:50

## 2020-03-17 RX ADMIN — Medication 30 MILLILITER(S): at 11:33

## 2020-03-17 RX ADMIN — Medication 650 MILLIGRAM(S): at 12:15

## 2020-03-17 NOTE — H&P ADULT - PROBLEM SELECTOR PLAN 1
Isolated fever. CT chest with no infectious source. No ground glass opacities.   No COVID contacts. No lymphopenia noted.  RVP negative.   Repeat labs pending to evaluate cbc with differential.   Atelectasis noted on imaging which may have caused fever. Incentive spirometer.   OOBTC with assistance.   General in hospital precautions. Surgical mask on patient. Isolated fever. CT chest with no infectious source. No ground glass opacities.   No COVID contacts. No recent travel. No recent hospitalization. No recent sick contacts. No lymphopenia noted.  RVP negative.   Repeat labs pending to evaluate cbc with differential.   Atelectasis noted on imaging which may have caused fever. Incentive spirometer.   OOBTC with assistance.   General in hospital precautions. Surgical mask on patient.  Possible that patient had viral illness not detected on RVP. Isolated fever. CT chest with no infectious source. No ground glass opacities.   No COVID contacts. No recent travel. No recent hospitalization. No recent sick contacts. No lymphopenia noted.  RVP negative.   Repeat labs pending to evaluate cbc with differential.   Atelectasis noted on imaging which may have caused fever. Incentive spirometer.   OOBTC with assistance.   General in hospital precautions. Surgical mask on patient.  Possible that patient had viral illness not detected on RVP. Low suspicion for COVID given lack of severe respiratory illness and lack of CT chest findings to support the diagnosis.

## 2020-03-17 NOTE — ED CDU PROVIDER SUBSEQUENT DAY NOTE - PROGRESS NOTE DETAILS
CDU PROGRESS NOTE KEVIN MOSES: Patient febrile w/ Temp 101F. In setting of cough, will get RVP r/o viral pathogens, UA pending. Patient w/o urinary symptoms. Less likely COVID-19 related disease, but will closely monitor. Tylenol 975mg po given. c/d/w Dr. Leon CDU PROGRESS NOTE KEVIN MOSES: Patient febrile w/ Temp 101F. In setting of cough, will get RVP r/o viral pathogens, UA pending. Patient w/o urinary symptoms. Less likely COVID-19 related disease, low risk, but will closely monitor. Tylenol 975mg po given. Pt will be transferred to a Iso room in [place], pending RVP results. c/d/w Dr. Leon brought to ED for uri w/ cough/congestion symptoms, dehydrated, improved w/ IVF, awake/alert, no distress to MRI -- Rustam Leon MD CDU PROGRESS NOTE KEVIN MOSES: Patient febrile w/ Temp 101F. In setting of cough, will get RVP r/o viral pathogens, UA pending. Patient w/o urinary symptoms. Possible COVID-19 related disease, low risk. Tylenol 975mg po given. Pt transferred to a Iso room in [place], pending RVP results. c/d/w Dr. Leon CDU PROGRESS NOTE KEVIN MOSES: c/d/w ED team, RVP negative, patient admitted to Medicine for further work up, COVID r/o

## 2020-03-17 NOTE — H&P ADULT - PROBLEM SELECTOR PLAN 4
Transitions of Care Status:  1.  Name of PCP: Unknown.  2.  PCP Contacted on Admission: [ ] Y    [ ] N    3.  PCP contacted at Discharge: [ ] Y    [ ] N    [ ] N/A  4.  Post-Discharge Appointment Date and Location:  5.  Summary of Handoff given to PCP: Lovenox  On ASA for primary ppx of cad. Incentive spirometer and OOBTC with assistance.

## 2020-03-17 NOTE — DISCHARGE NOTE PROVIDER - NSDCCPCAREPLAN_GEN_ALL_CORE_FT
PRINCIPAL DISCHARGE DIAGNOSIS  Diagnosis: Dizziness  Assessment and Plan of Treatment: Continue Meclizine 12.5 mg as needed every 8 hours.   Move slowly when you sit up and/or standup.    Follow up with PMD in 3 days.      SECONDARY DISCHARGE DIAGNOSES  Diagnosis: Cough  Assessment and Plan of Treatment:     Diagnosis: Fever  Assessment and Plan of Treatment: PRINCIPAL DISCHARGE DIAGNOSIS  Diagnosis: Dizziness  Assessment and Plan of Treatment: Continue Meclizine 12.5 mg as needed every 8 hours.   Move slowly when you sit up and/or standup.    Follow up with PMD in 3 days.      SECONDARY DISCHARGE DIAGNOSES  Diagnosis: Cough  Assessment and Plan of Treatment: could be from acid reflux, post nasal drip, or some bronchospasm. We are giving you flonase and an albuterol inhaler to try at home. You can  tums for acid reflux. Avoid spicy food, chocolate, coffee, sweets, sour foods, and greasy foods. Try not to eat food less than 3 hours prior to sleeping    Diagnosis: Fever  Assessment and Plan of Treatment: This was an isolated fever. You may have an upper respiratory illness. No need for abx. Make sure you get rest at home and eat well.

## 2020-03-17 NOTE — ED ADULT NURSE REASSESSMENT NOTE - NS ED NURSE REASSESS COMMENT FT1
Pt leaving unit to wait for results of RVP. Report given to RAGINI Hu. AO4. VSS as per flowsheet. Pt denies pain. Pt on a cardiac monitor. Safety maintained.
Pt transported to MRI exam. Resting comfortably. VSS.
Received report from RAGINI Hu. Patient returned from MRI. Patient VSS, admitted to tele, clicked RTM. Patient resting in bed, side rails up, call bell within reach. Plan of care explained.
Report received from CDU RN Herlinda. Pt moved from CDU back to main ED d/t new development of dry cough and fever, pending RVP results. Pt AAOx4, NAD, resp nonlabored, skin warm/dry, resting comfortably in bed. Pt denies headache, dizziness, chest pain, palpitations, SOB, abd pain, n/v/d, urinary symptoms, chills, weakness at this time. VSS. Safety maintained with call bell within reach.
@7525 Pt received from RAGINI Wright. Pt oriented to CDU & plan of care was discussed. Pt A&O x 3. Pt in CDU for Telemetry, Neuro checks, MRI, Echocardiogram. Pt denies any dizziness, lightheadedness as of now. Pt has an unsteady gait d/t to the dizziness when she walks. Pt on a cardiac monitor in NSR, HR in 70's. Pt neuro check intact, no deficits noted besides unsteady gait. Pt resting in bed. Safety & comfort measures maintained. Call bell in reach. Will continue to monitor.

## 2020-03-17 NOTE — DISCHARGE NOTE NURSING/CASE MANAGEMENT/SOCIAL WORK - NSDCFUADDAPPT_GEN_ALL_CORE_FT
Outpatient neuro follow up warranted with Dr. Serjio Arnold at [phone]. Please call to make an appointment.

## 2020-03-17 NOTE — H&P ADULT - NSHPPHYSICALEXAM_GEN_ALL_CORE
T(C): 36.8 (03-17-20 @ 10:00), Max: 38.7 (03-17-20 @ 04:40)  T(F): 98.3 (03-17-20 @ 10:00), Max: 101.6 (03-17-20 @ 04:40)  HR: 59 (03-17-20 @ 10:00) (59 - 82)  BP: 112/67 (03-17-20 @ 10:00) (93/54 - 126/68)  ABP: --  ABP(mean): --  RR: 19 (03-17-20 @ 10:00) (15 - 19)  SpO2: 97% (03-17-20 @ 10:00) (96% - 100%)    CONSTITUTIONAL: No acute distress.   HEENT:  Conjunctiva clear B/L. Nasal mucosa normal. Moist oral mucosa. No posterior pharyngeal lesions noted.  Cardiovascular: RRR with no murmurs. No JVD noted. No lower extremity edema B/L. Extremities are warm and well perfused. Radial pulses 2+ B/L. Dorsalis pedis pulses 2+ B/L.    Respiratory: Lungs CTAB. No accessory muscle use.   Gastrointestinal:  Soft, nontender. Non-distended. Non-rigid. No CVA tenderness B/L.  MSK:  No joint swelling. No joint erythema B/L. No midline spinal tenderness.  Neurologic:  Alert and awake. Oriented x3. Moving all extremities. Following commands. Making eye contact. PERRL. EOMI. No nystagmus. Brachial and patellar reflexes 2/4 b/l. Babinski downgoing.   Skin:  No rashes noted. No skin erythema noted.   Psych:  Normal affect. Normal Mood.

## 2020-03-17 NOTE — H&P ADULT - PROBLEM SELECTOR PLAN 5
Transitions of Care Status:  1.  Name of PCP: Unknown.  2.  PCP Contacted on Admission: [ ] Y    [ ] N    3.  PCP contacted at Discharge: [ ] Y    [ ] N    [ ] N/A  4.  Post-Discharge Appointment Date and Location:  5.  Summary of Handoff given to PCP: Transitions of Care Status:  1.  Name of PCP: Dr. Vladislav Barraza  2.  PCP Contacted on Admission: [ x] Y    [ ] N    3.  PCP contacted at Discharge: [ ] Y    [ ] N    [ ] N/A  4.  Post-Discharge Appointment Date and Location: To follow up in 1 week.   5.  Summary of Handoff given to PCP: yes. Lovenox  On ASA for primary ppx of cad.

## 2020-03-17 NOTE — DISCHARGE NOTE PROVIDER - NSDCFUADDAPPT_GEN_ALL_CORE_FT
Outpatient neuro follow up warranted with Dr. Serjio Arnold at [phone]. Please call to make an appointment.

## 2020-03-17 NOTE — H&P ADULT - PROBLEM SELECTOR PLAN 6
Transitions of Care Status:  1.  Name of PCP: Dr. Vladislav Barraza  2.  PCP Contacted on Admission: [ x] Y    [ ] N    3.  PCP contacted at Discharge: [ ] Y    [ ] N    [ ] N/A  4.  Post-Discharge Appointment Date and Location: To follow up in 1 week.   5.  Summary of Handoff given to PCP: yes.

## 2020-03-17 NOTE — H&P ADULT - PROBLEM SELECTOR PLAN 3
Could be from atelectasis.   Will empirically treat post nasal drip, bronchospasm, and gerd.   Albuterol inhaler, flonase, maalox, and tessalon perle. Could be from atelectasis.   Will empirically treat post nasal drip, mild bronchospasm, and gerd.   Albuterol inhaler, flonase, maalox, and tessalon perle.

## 2020-03-17 NOTE — ED CDU PROVIDER SUBSEQUENT DAY NOTE - HISTORY
CDU PROGRESS NOTE KEVIN MOSES: Pt resting comfortably, NAD, No events on telemetry. Pt is aox3, speaking coherently, no focal neuro deficits. Pt states dizziness has subsided, but still present. MRI brain w/o contrast brain, TTE w/ bubble study pending. Neurology following.

## 2020-03-17 NOTE — DISCHARGE NOTE PROVIDER - NSDCMRMEDTOKEN_GEN_ALL_CORE_FT
alendronate 70 mg oral tablet: 1 tab(s) orally once a week (Sunday)  aspirin 81 mg oral tablet: 1 tab(s) orally once a day  gabapentin 300 mg oral capsule: 1 cap(s) orally once a day, As Needed    Note: #90 dispensed 1/23/20    Lipitor 20 mg oral tablet: 1 tab(s) orally once a day  meclizine 12.5 mg oral tablet: 1 tab(s) orally every 8 hours, As Needed -vertigo MDD:3  meloxicam 15 mg oral tablet: 1 tab(s) orally once a day, As Needed  Multiple Vitamins oral tablet: 1 tab(s) orally once a day albuterol 90 mcg/inh inhalation aerosol: 1 puff(s) inhaled every 6 hours, As needed, cough MDD:3  alendronate 70 mg oral tablet: 1 tab(s) orally once a week (Sunday)  aspirin 81 mg oral tablet: 1 tab(s) orally once a day  fluticasone 50 mcg/inh nasal spray: 1 spray(s) nasal 2 times a day MDD:4  gabapentin 300 mg oral capsule: 1 cap(s) orally once a day, As Needed    Note: #90 dispensed 1/23/20    Lipitor 20 mg oral tablet: 1 tab(s) orally once a day  meclizine 12.5 mg oral tablet: 1 tab(s) orally every 8 hours, As Needed -vertigo MDD:3  meloxicam 15 mg oral tablet: 1 tab(s) orally once a day, As Needed  Multiple Vitamins oral tablet: 1 tab(s) orally once a day

## 2020-03-17 NOTE — H&P ADULT - NSHPREVIEWOFSYSTEMS_GEN_ALL_CORE
REVIEW OF SYSTEMS  CONSTITUTIONAL: No fevers. No chills  EYES/ENT: No visual changes. No discharge from eyes. No vertigo. No throat pain. No dysphagia.  NECK: No pain or stiffness or rigidity.  RESPIRATORY: +cough. No wheezing. No hemoptysis. No shortness of breath.  CARDIOVASCULAR: No chest pain. No palpitations.   GASTROINTESTINAL: No abdominal pain. No nausea. No vomiting. No hematemesis. No diarrhea. No constipation. No melena, No hematochezia.  GENITOURINARY: No dysuria. No hesitancy. No frequency. No hematuria.  NEUROLOGICAL: No numbness. No weakness. No change in speech. No fecal or urinary incontinence.   MSK: No joint swelling or erythema. No back pain.  SKIN: No itching or rashes.   PSYCH: Normal affect. Normal mood. No si. No hi.    Review of systems negative except for items noted above.

## 2020-03-17 NOTE — DISCHARGE NOTE PROVIDER - HOSPITAL COURSE
To be done. PROBLEM LIST         Problem/Plan - 1:    ·  Problem: Fever.  Plan: Isolated fever. CT chest with no infectious source. No ground glass opacities.     No COVID contacts. No recent travel. No recent hospitalization. No recent sick contacts. No lymphopenia noted.    RVP negative.     Repeat cbc with differential without lymphopenia. No leukocytosis or leukopenia.     Atelectasis noted on imaging which may have caused fever. Incentive spirometer.     OOBTC with assistance.     General in hospital precautions. Surgical mask on patient.    Possible that patient had viral illness not detected on RVP. Low suspicion for COVID given lack of severe respiratory illness and lack of CT chest findings to support the diagnosis.          Problem/Plan - 2:    ·  Problem: Dizziness.  Plan: Neuro consult appreciated.    Symptoms were vertigo-like.     CTA head and neck as well as MRI brain did not elucidate any acute findings.     TTE with bubble study done. Did not show any findings which would explain episode of dizziness.     Outpatient neuro follow up warranted with Dr. Serjio Arnold at [phone]    Given fever, consideration given to viral labyrinthitis.    EKG with nonspecific changes and trop negative x2.          Problem/Plan - 3:    ·  Problem: Cough.  Plan: Could be from atelectasis.     Will empirically treat post nasal drip, mild bronchospasm, and gerd.     Albuterol inhaler, flonase, maalox, and tessalon perle.          Problem/Plan - 4:    ·  Problem: Atelectasis.  Plan: Incentive spirometer and OOBTC with assistance.          Problem/Plan - 5:    ·  Problem: Need for prophylactic measure.  Plan: Lovenox    On ASA for primary ppx of cad.         Meclizine as needed    Flonase and albuterol inhaler as needed.    He will  tums for acid reflux if needed.     Outpatient follow up with PCP Dr. Vladislav Barraza next Monday and Neurology at the next available appointment.     The above was discuss with the patient's son Amari Iqbal.     The patient is hemodynamically stable with no acute complaints. She is medically optimized for discharge with close outpatient follow up.         Discharge time spent: 45 minutes

## 2020-03-17 NOTE — DISCHARGE NOTE PROVIDER - CARE PROVIDER_API CALL
Vladislav Barraza)  Internal Medicine  [address] [place] [address], [address]  [place] [address]  Phone: [phone]  Fax: [phone]  Established Patient  Follow Up Time: 1-3 days

## 2020-03-17 NOTE — DISCHARGE NOTE NURSING/CASE MANAGEMENT/SOCIAL WORK - PATIENT PORTAL LINK FT
You can access the FollowMyHealth Patient Portal offered by [facility] by registering at the following website: [email or web address]. By joining [facility]’s FollowMyHealth portal, you will also be able to view your health information using other applications (apps) compatible with our system.

## 2020-03-17 NOTE — H&P ADULT - PROBLEM SELECTOR PLAN 2
Neuro consult appreciated.  CTA head and neck as well as MRI brain did not elucidate any acute findings.   Still pending TTE with bubble study.  Outpatient neuro follow up warranted. Neuro consult appreciated.  CTA head and neck as well as MRI brain did not elucidate any acute findings.   Still pending TTE with bubble study.  Outpatient neuro follow up warranted.  Given fever, consideration given to viral labyrinthitis. Neuro consult appreciated.  CTA head and neck as well as MRI brain did not elucidate any acute findings.   Still pending TTE with bubble study. Had note of mild AS 1 year ago per patients PCP.   Outpatient neuro follow up warranted.  Given fever, consideration given to viral labyrinthitis. Neuro consult appreciated.  Symptoms were vertigo-like.   CTA head and neck as well as MRI brain did not elucidate any acute findings.   Still pending TTE with bubble study. Had note of mild AS 1 year ago per patients PCP.   Outpatient neuro follow up warranted.  Given fever, consideration given to viral labyrinthitis.  EKG with nonspecific changes and trop negative x2.

## 2020-03-17 NOTE — H&P ADULT - NSHPLABSRESULTS_GEN_ALL_CORE
LABS:                        11.8   7.14  )-----------( 292      ( 16 Mar 2020 17:38 )             38.5     03-16    141  |  102  |  11  ----------------------------<  87  4.6   |  24  |  0.43<L>    Ca    9.3      16 Mar 2020 17:38  Phos  4.3     03-16  Mg     1.9     03-16    TPro  8.0  /  Alb  4.2  /  TBili  0.3  /  DBili  x   /  AST  27  /  ALT  16  /  AlkPhos  65  03-16    PT/INR - ( 16 Mar 2020 17:38 )   PT: 11.0 sec;   INR: 0.96 ratio         PTT - ( 16 Mar 2020 17:38 )  PTT:30.0 sec  Urinalysis Basic - ( 17 Mar 2020 05:08 )    Color: Light Yellow / Appearance: Clear / S.032 / pH: x  Gluc: x / Ketone: Negative  / Bili: Negative / Urobili: Negative   Blood: x / Protein: Negative / Nitrite: Negative   Leuk Esterase: Negative / RBC: 1 /hpf / WBC 1 /HPF   Sq Epi: x / Non Sq Epi: 0 /hpf / Bacteria: Negative      RADIOLOGIST REPORT REVIEWED FOR:  CT head  CTA head and neck  MRI head.     CASE DISCUSSED WITH CONSULTANTS:  Yes [x ] No [ ]  -- neuro.     PRIOR RECORDS OR OUTPATIENT RECORDS REVIEWED:  Yes [x ] No [ ]  -- discuss with patients PCP, Dr. Vladislav Barraza. Has mild AS. LABS:                        11.8   7.14  )-----------( 292      ( 16 Mar 2020 17:38 )             38.5     03-16    141  |  102  |  11  ----------------------------<  87  4.6   |  24  |  0.43<L>    Ca    9.3      16 Mar 2020 17:38  Phos  4.3     03-16  Mg     1.9     03-16    TPro  8.0  /  Alb  4.2  /  TBili  0.3  /  DBili  x   /  AST  27  /  ALT  16  /  AlkPhos  65  03-16    PT/INR - ( 16 Mar 2020 17:38 )   PT: 11.0 sec;   INR: 0.96 ratio         PTT - ( 16 Mar 2020 17:38 )  PTT:30.0 sec  Urinalysis Basic - ( 17 Mar 2020 05:08 )    Color: Light Yellow / Appearance: Clear / S.032 / pH: x  Gluc: x / Ketone: Negative  / Bili: Negative / Urobili: Negative   Blood: x / Protein: Negative / Nitrite: Negative   Leuk Esterase: Negative / RBC: 1 /hpf / WBC 1 /HPF   Sq Epi: x / Non Sq Epi: 0 /hpf / Bacteria: Negative    EKG: NSR; PAC noted; nonspecific st-t changes.     RADIOLOGIST REPORT REVIEWED FOR:  CT head  CTA head and neck  MRI head.     CASE DISCUSSED WITH CONSULTANTS:  Yes [x ] No [ ]  -- neuro.     PRIOR RECORDS OR OUTPATIENT RECORDS REVIEWED:  Yes [x ] No [ ]  -- discuss with patients PCP, Dr. Vladislav Barraza. Has mild AS.

## 2020-03-17 NOTE — ED CDU PROVIDER DISPOSITION NOTE - CLINICAL COURSE
70 y/o female with no reported PMHx no PSx now presenting to the ED c/o dizziness with room spinning x1 day s/p unwitnessed syncopal event yesterday. Patient reported she syncopized onto a carpeted floor yesterday for unknown period of time. Patient has never syncopized in the past. Since the event pt has had diffuse headache and has not taken medication for pain as of yet. Patient feels unsteady on her feet requesting assistance to ambulate which is not her baseline. Patient denied CP, SOB, N/V/D, diarrhea, fever, cough, COVID sick contacts, abdominal pain, slurred speech, numbness, urinary/fecal incontinence, facial droop, weakness in extremity, previous h/o stroke, blurry vision  In ED, patient had CT head w/o contrast, CT angio head and neck showing no signs of acute pathology. Pt was evaluated by Neurology and sent to CDU for MRI brain w/o contrast and TTE. 68 y/o female with no reported PMHx no PSx now presenting to the ED c/o dizziness with room spinning x1 day s/p unwitnessed syncopal event yesterday. Patient reported she syncopized onto a carpeted floor yesterday for unknown period of time. Patient has never syncopized in the past. Since the event pt has had diffuse headache and has not taken medication for pain as of yet. Patient feels unsteady on her feet requesting assistance to ambulate which is not her baseline. Patient denied CP, SOB, N/V/D, diarrhea, fever, cough, COVID sick contacts, abdominal pain, slurred speech, numbness, urinary/fecal incontinence, facial droop, weakness in extremity, previous h/o stroke, blurry vision  In ED, patient had CT head w/o contrast, CT angio head and neck showing no signs of acute pathology. Pt was evaluated by Neurology and sent to CDU for MRI brain w/o contrast and TTE.   While in CDU, Patient febrile w/ Temp 101F. In setting of cough, will get RVP r/o viral pathogens, UA pending. Patient w/o urinary symptoms. Less likely COVID-19 related disease, low risk, but will closely monitor. Tylenol 975mg po given. Pt transferred to [facility] room in [place], pending RVP results. c/d/w Dr. Leon. Will admit to medicine

## 2020-03-17 NOTE — H&P ADULT - HISTORY OF PRESENT ILLNESS
Dr. Dilan Isaacs, DO  Attending Physician  Division of Hospital Medicine  [facility]  Pager:  [phone] Dr. Dilan Isaacs DO  Attending Physician  Division of Hospital Medicine  [facility]  Pager:  [phone]    Pleasant 69 year old female with PMHX of osteoporosis here with 1 episode of dizziness and room-spinning sensation. She reports that she got up and walked to the kitchen, then felt the room spinning. She states that she did not lose consciousness but sat on the floor The symptoms persisted for 30 minutes or so. Symptoms worse with head movements. Denies any history of this. She reports some mild headache that has resolved. Of note, she does have some dry cough x2 days that gets better with drinking water. She reports a dry throat. Denies dyspnea, nausea, vomiting, diarrhea, abd pain, dysuria, frequency, hesitancy, urgency, focal weakness, numbness, tingling. No COVID contacts. No recent travel. No recent hospitalization. No recent sick contacts.

## 2021-01-12 ENCOUNTER — APPOINTMENT (OUTPATIENT)
Dept: NEUROSURGERY | Facility: CLINIC | Age: 71
End: 2021-01-12
Payer: MEDICARE

## 2021-01-12 VITALS
HEART RATE: 57 BPM | SYSTOLIC BLOOD PRESSURE: 126 MMHG | WEIGHT: 98 LBS | DIASTOLIC BLOOD PRESSURE: 73 MMHG | HEIGHT: 55 IN | BODY MASS INDEX: 22.68 KG/M2

## 2021-01-12 VITALS — TEMPERATURE: 97.3 F

## 2021-01-12 PROCEDURE — 99213 OFFICE O/P EST LOW 20 MIN: CPT

## 2021-01-12 NOTE — ASSESSMENT
[FreeTextEntry1] : 70 year old female with low back and lumbar radicular pain now returned.  She will follow up with me next week for her procedure.

## 2021-01-12 NOTE — REASON FOR VISIT
[Follow-Up: _____] : a [unfilled] follow-up visit [FreeTextEntry1] : Patient returns after being seen about 1 year ago.  She states that her pain has returned and she would like another injection.

## 2021-01-15 VITALS
WEIGHT: 102.07 LBS | HEIGHT: 57 IN | HEART RATE: 63 BPM | RESPIRATION RATE: 16 BRPM | SYSTOLIC BLOOD PRESSURE: 131 MMHG | DIASTOLIC BLOOD PRESSURE: 65 MMHG | OXYGEN SATURATION: 100 % | TEMPERATURE: 98 F

## 2021-01-15 NOTE — H&P ADULT - ASSESSMENT
69 F with PMHx HTN, HLD, Pancolitis 2013, Osteoporosis, mild to moderate AR (by echo 3/2020), GERD who presents to [facility] for recommended cardiac catheterization with possible intervention if clinically indicated, in light of pts risk factors, CCS class III anginal symptoms and abnormal NST.    -ASA 3, Mallampati 3  -  -IVF Hydration NS @ 75cc/hr  -precath consented    Risks & benefits of procedure and alternative therapy have been explained to the patient including but not limited to: allergic reaction, bleeding w/possible need for blood transfusion, infection, renal and vascular compromise, limb damage, arrhythmia, stroke, vessel dissection/perforation, Myocardial infarction, emergent CABG. Informed consent obtained and in chart.    69 F with PMHx HTN, HLD, Pancolitis 2013, Osteoporosis, mild to moderate AR (by echo 3/2020), GERD who presents to [facility] for recommended cardiac catheterization with possible intervention if clinically indicated, in light of pts risk factors, CCS class III anginal symptoms and abnormal NST.    -ASA 3, Mallampati 3  -Pt compliant with home ASA 81mg, last dose yesterday. Will give home dose of ASA and load with Plavix 600mg prior to cath  -IVF Hydration NS @ 75cc/hr  -precath consented    Risks & benefits of procedure and alternative therapy have been explained to the patient including but not limited to: allergic reaction, bleeding w/possible need for blood transfusion, infection, renal and vascular compromise, limb damage, arrhythmia, stroke, vessel dissection/perforation, Myocardial infarction, emergent CABG. Informed consent obtained and in chart.

## 2021-01-15 NOTE — H&P ADULT - HISTORY OF PRESENT ILLNESS
Covid 1/16 at [facility] -  Pharmacy: [facility] [phone]  [place] [facility] [address]  Cardiologist: Dr. Hugo   Escort: Rhoda Fitzpatrick  PATIENT WILL BRING MEDS PLEASE UPDATE  History obtained from patients rhoda Fitzpatrick (reliable) and Dr. Alfonso office note   69 F with PMHx HTN, Hyperlipidemia. Pancolitis 2013, Osteoporosis, mild to moderate AR (by echo 3/2020), GERD who presented to cardiologist c/o substernal/epigastric pain x 4-5 months especially at rest per MD note. Patient also reports increased fatigue. Patient denies SOB, palpitations, dizziness, diaphoresis, N/V, syncope, LE edema,  PND, orthopnea, fever, chills, cough, recent travel, abdominal pain, diarrhea, known contact with Covid 19 + individuals or sick contacts, loss of taste or smell, URI symptoms, muscle pain.   Nuclear Stress Test 11/28/2020 revealed mild anterior wall segment perfusion abnormality LVEF>75%, RV is normal in size and wall motion. Echocardiogram 3/2020 revealed normal LVEF with no segmental wall motions abnormalities, minimal MR, mild to moderate AR, normal RV size and function.   In light of patient’s risk factors, CCS Angina Class III-IV Symptoms and abnormal nuclear stress test patient now presents for cardiac catheterization with possible intervention if clinically indicated.    Covid 1/16 negative  Pharmacy: [facility] [phone]  [place] [facility] [id number]  Cardiologist: Dr. Hugo   Escort: Rhoda Fitzpatrick    History obtained from patients rhoda Fitzpatrick (reliable) and Dr. Alfonso office note   69 F with PMHx HTN, HLD, Pancolitis 2013, Osteoporosis, mild to moderate AR (by echo 3/2020), GERD who presented to cardiologist c/o substernal/epigastric pain x 4-5 months especially at rest. Patient also reports increased fatigue. Patient denies SOB, palpitations, dizziness, diaphoresis, N/V, syncope, LE edema, PND, orthopnea, fever, chills, cough, recent travel, abdominal pain, diarrhea, known contact with Covid 19 + individuals or sick contacts, loss of taste or smell, URI symptoms, muscle pain. Pt underwent Nuclear Stress Test 11/28/2020 revealed mild anterior wall segment perfusion abnormality LVEF>75%, RV is normal in size and wall motion. Echocardiogram 3/2020 revealed normal LVEF with no segmental wall motions abnormalities, minimal MR, mild to moderate AR, normal RV size and function.   In light of patient’s risk factors, CCS Angina Class III-IV Symptoms and abnormal nuclear stress test patient now presents for cardiac catheterization with possible intervention if clinically indicated. Covid 1/16 negative  Pharmacy: [facility] [phone]  [place] [facility] [id number]  Cardiologist: Dr. Hugo   Escort: Rhoda Fitzpatrick    History obtained from patients rhoda Fitzpatrick (reliable) and Dr. Alfonso office note   68yo Female with PMHx HTN, HLD, Pancolitis 2013, Osteoporosis, mild to moderate AR and GERD who presented to cardiologist c/o substernal/epigastric pain x 4-5 months especially at rest. Pt also reports increased fatigue. Patient denies SOB, palpitations, dizziness, diaphoresis, N/V, syncope, LE edema, PND, orthopnea, fever, chills, cough, recent travel, abdominal pain, diarrhea, known contact with Covid 19 + individuals or sick contacts, loss of taste or smell, URI symptoms, muscle pain. Pt underwent Nuclear Stress Test 11/28/2020 revealed mild anterior wall segment perfusion abnormality LVEF>75%, RV is normal in size and wall motion. Echocardiogram 3/2020 revealed normal LVEF with no segmental wall motions abnormalities, minimal MR, mild to moderate AR, normal RV size and function.   In light of patient’s risk factors, CCS Angina Class III-IV Symptoms and abnormal nuclear stress test patient now presents for cardiac catheterization with possible intervention if clinically indicated.

## 2021-01-15 NOTE — H&P ADULT - NSHPLABSRESULTS_GEN_ALL_CORE
EKG: 12.2   7.25  )-----------( 319      ( 19 Jan 2021 09:04 )             39.2       PT/INR - ( 19 Jan 2021 09:04 )   PT: 11.6 sec;   INR: 0.97          PTT - ( 19 Jan 2021 09:04 )  PTT:34.4 sec

## 2021-01-15 NOTE — H&P ADULT - NSICDXPASTMEDICALHX_GEN_ALL_CORE_FT
PAST MEDICAL HISTORY:  Pancolitis      PAST MEDICAL HISTORY:  GERD (gastroesophageal reflux disease)     HLD (hyperlipidemia)     HTN (hypertension)     Osteoporosis     Pancolitis

## 2021-01-19 ENCOUNTER — OUTPATIENT (OUTPATIENT)
Dept: OUTPATIENT SERVICES | Facility: HOSPITAL | Age: 71
LOS: 1 days | Discharge: HOME | End: 2021-01-19
Payer: MEDICARE

## 2021-01-19 DIAGNOSIS — E78.5 HYPERLIPIDEMIA, UNSPECIFIED: ICD-10-CM

## 2021-01-19 DIAGNOSIS — I25.110 ATHEROSCLEROTIC HEART DISEASE OF NATIVE CORONARY ARTERY WITH UNSTABLE ANGINA PECTORIS: ICD-10-CM

## 2021-01-19 DIAGNOSIS — I10 ESSENTIAL (PRIMARY) HYPERTENSION: ICD-10-CM

## 2021-01-19 DIAGNOSIS — E11.9 TYPE 2 DIABETES MELLITUS WITHOUT COMPLICATIONS: ICD-10-CM

## 2021-01-19 DIAGNOSIS — R94.39 ABNORMAL RESULT OF OTHER CARDIOVASCULAR FUNCTION STUDY: ICD-10-CM

## 2021-01-19 DIAGNOSIS — H26.9 UNSPECIFIED CATARACT: Chronic | ICD-10-CM

## 2021-01-19 LAB
A1C WITH ESTIMATED AVERAGE GLUCOSE RESULT: 5.3 % — SIGNIFICANT CHANGE UP (ref 4–5.6)
ALBUMIN SERPL ELPH-MCNC: 4.3 G/DL — SIGNIFICANT CHANGE UP (ref 3.3–5)
ALP SERPL-CCNC: 63 U/L — SIGNIFICANT CHANGE UP (ref 40–120)
ALT FLD-CCNC: 16 U/L — SIGNIFICANT CHANGE UP (ref 10–45)
ANION GAP SERPL CALC-SCNC: 11 MMOL/L — SIGNIFICANT CHANGE UP (ref 5–17)
APTT BLD: 34.4 SEC — SIGNIFICANT CHANGE UP (ref 27.5–35.5)
AST SERPL-CCNC: 20 U/L — SIGNIFICANT CHANGE UP (ref 10–40)
BASOPHILS # BLD AUTO: 0.02 K/UL — SIGNIFICANT CHANGE UP (ref 0–0.2)
BASOPHILS NFR BLD AUTO: 0.3 % — SIGNIFICANT CHANGE UP (ref 0–2)
BILIRUB DIRECT SERPL-MCNC: <0.2 MG/DL — SIGNIFICANT CHANGE UP (ref 0–0.2)
BILIRUB INDIRECT FLD-MCNC: SIGNIFICANT CHANGE UP MG/DL (ref 0.2–1)
BILIRUB SERPL-MCNC: 0.4 MG/DL — SIGNIFICANT CHANGE UP (ref 0.2–1.2)
BUN SERPL-MCNC: 8 MG/DL — SIGNIFICANT CHANGE UP (ref 7–23)
CALCIUM SERPL-MCNC: 8.7 MG/DL — SIGNIFICANT CHANGE UP (ref 8.4–10.5)
CHLORIDE SERPL-SCNC: 106 MMOL/L — SIGNIFICANT CHANGE UP (ref 96–108)
CHOLEST SERPL-MCNC: 139 MG/DL — SIGNIFICANT CHANGE UP
CK MB CFR SERPL CALC: 2.1 NG/ML — SIGNIFICANT CHANGE UP (ref 0–6.7)
CK SERPL-CCNC: 69 U/L — SIGNIFICANT CHANGE UP (ref 25–170)
CO2 SERPL-SCNC: 27 MMOL/L — SIGNIFICANT CHANGE UP (ref 22–31)
CREAT SERPL-MCNC: 0.5 MG/DL — SIGNIFICANT CHANGE UP (ref 0.5–1.3)
CRP SERPL-MCNC: 0.07 MG/DL — SIGNIFICANT CHANGE UP (ref 0–0.4)
EOSINOPHIL # BLD AUTO: 0.12 K/UL — SIGNIFICANT CHANGE UP (ref 0–0.5)
EOSINOPHIL NFR BLD AUTO: 1.7 % — SIGNIFICANT CHANGE UP (ref 0–6)
ESTIMATED AVERAGE GLUCOSE: 105 MG/DL — SIGNIFICANT CHANGE UP (ref 68–114)
GLUCOSE SERPL-MCNC: 96 MG/DL — SIGNIFICANT CHANGE UP (ref 70–99)
HCT VFR BLD CALC: 39.2 % — SIGNIFICANT CHANGE UP (ref 34.5–45)
HDLC SERPL-MCNC: 58 MG/DL — SIGNIFICANT CHANGE UP
HGB BLD-MCNC: 12.2 G/DL — SIGNIFICANT CHANGE UP (ref 11.5–15.5)
IMM GRANULOCYTES NFR BLD AUTO: 0.1 % — SIGNIFICANT CHANGE UP (ref 0–1.5)
INR BLD: 0.97 — SIGNIFICANT CHANGE UP (ref 0.88–1.16)
LIPID PNL WITH DIRECT LDL SERPL: 63 MG/DL — SIGNIFICANT CHANGE UP
LYMPHOCYTES # BLD AUTO: 3.08 K/UL — SIGNIFICANT CHANGE UP (ref 1–3.3)
LYMPHOCYTES # BLD AUTO: 42.5 % — SIGNIFICANT CHANGE UP (ref 13–44)
MCHC RBC-ENTMCNC: 28.6 PG — SIGNIFICANT CHANGE UP (ref 27–34)
MCHC RBC-ENTMCNC: 31.1 GM/DL — LOW (ref 32–36)
MCV RBC AUTO: 91.8 FL — SIGNIFICANT CHANGE UP (ref 80–100)
MONOCYTES # BLD AUTO: 0.65 K/UL — SIGNIFICANT CHANGE UP (ref 0–0.9)
MONOCYTES NFR BLD AUTO: 9 % — SIGNIFICANT CHANGE UP (ref 2–14)
NEUTROPHILS # BLD AUTO: 3.37 K/UL — SIGNIFICANT CHANGE UP (ref 1.8–7.4)
NEUTROPHILS NFR BLD AUTO: 46.4 % — SIGNIFICANT CHANGE UP (ref 43–77)
NON HDL CHOLESTEROL: 81 MG/DL — SIGNIFICANT CHANGE UP
NRBC # BLD: 0 /100 WBCS — SIGNIFICANT CHANGE UP (ref 0–0)
PLATELET # BLD AUTO: 319 K/UL — SIGNIFICANT CHANGE UP (ref 150–400)
POTASSIUM SERPL-MCNC: 4 MMOL/L — SIGNIFICANT CHANGE UP (ref 3.5–5.3)
POTASSIUM SERPL-SCNC: 4 MMOL/L — SIGNIFICANT CHANGE UP (ref 3.5–5.3)
PROT SERPL-MCNC: 7.6 G/DL — SIGNIFICANT CHANGE UP (ref 6–8.3)
PROTHROM AB SERPL-ACNC: 11.6 SEC — SIGNIFICANT CHANGE UP (ref 10.6–13.6)
RBC # BLD: 4.27 M/UL — SIGNIFICANT CHANGE UP (ref 3.8–5.2)
RBC # FLD: 14.6 % — HIGH (ref 10.3–14.5)
SARS-COV-2 N GENE NPH QL NAA+PROBE: NOT DETECTED
SODIUM SERPL-SCNC: 144 MMOL/L — SIGNIFICANT CHANGE UP (ref 135–145)
TRIGL SERPL-MCNC: 89 MG/DL — SIGNIFICANT CHANGE UP
WBC # BLD: 7.25 K/UL — SIGNIFICANT CHANGE UP (ref 3.8–10.5)
WBC # FLD AUTO: 7.25 K/UL — SIGNIFICANT CHANGE UP (ref 3.8–10.5)

## 2021-01-19 PROCEDURE — 86140 C-REACTIVE PROTEIN: CPT

## 2021-01-19 PROCEDURE — 80061 LIPID PANEL: CPT

## 2021-01-19 PROCEDURE — C1887: CPT

## 2021-01-19 PROCEDURE — 36415 COLL VENOUS BLD VENIPUNCTURE: CPT

## 2021-01-19 PROCEDURE — C1769: CPT

## 2021-01-19 PROCEDURE — 82550 ASSAY OF CK (CPK): CPT

## 2021-01-19 PROCEDURE — 85025 COMPLETE CBC W/AUTO DIFF WBC: CPT

## 2021-01-19 PROCEDURE — 80053 COMPREHEN METABOLIC PANEL: CPT

## 2021-01-19 PROCEDURE — 93010 ELECTROCARDIOGRAM REPORT: CPT

## 2021-01-19 PROCEDURE — 93454 CORONARY ARTERY ANGIO S&I: CPT

## 2021-01-19 PROCEDURE — 82553 CREATINE MB FRACTION: CPT

## 2021-01-19 PROCEDURE — 85610 PROTHROMBIN TIME: CPT

## 2021-01-19 PROCEDURE — 82248 BILIRUBIN DIRECT: CPT

## 2021-01-19 PROCEDURE — C1894: CPT

## 2021-01-19 PROCEDURE — 93005 ELECTROCARDIOGRAM TRACING: CPT

## 2021-01-19 PROCEDURE — 83036 HEMOGLOBIN GLYCOSYLATED A1C: CPT

## 2021-01-19 PROCEDURE — 93454 CORONARY ARTERY ANGIO S&I: CPT | Mod: 26

## 2021-01-19 PROCEDURE — 85730 THROMBOPLASTIN TIME PARTIAL: CPT

## 2021-01-19 RX ORDER — ASPIRIN/CALCIUM CARB/MAGNESIUM 324 MG
81 TABLET ORAL ONCE
Refills: 0 | Status: COMPLETED | OUTPATIENT
Start: 2021-01-19 | End: 2021-01-19

## 2021-01-19 RX ORDER — METOPROLOL TARTRATE 50 MG
25 TABLET ORAL ONCE
Refills: 0 | Status: COMPLETED | OUTPATIENT
Start: 2021-01-19 | End: 2021-01-19

## 2021-01-19 RX ORDER — PANTOPRAZOLE SODIUM 20 MG/1
1 TABLET, DELAYED RELEASE ORAL
Qty: 0 | Refills: 0 | DISCHARGE

## 2021-01-19 RX ORDER — AMLODIPINE BESYLATE 2.5 MG/1
1 TABLET ORAL
Qty: 0 | Refills: 0 | DISCHARGE

## 2021-01-19 RX ORDER — CHLORHEXIDINE GLUCONATE 213 G/1000ML
1 SOLUTION TOPICAL ONCE
Refills: 0 | Status: DISCONTINUED | OUTPATIENT
Start: 2021-01-19 | End: 2021-01-19

## 2021-01-19 RX ORDER — ALENDRONATE SODIUM 70 MG/1
1 TABLET ORAL
Qty: 0 | Refills: 0 | DISCHARGE

## 2021-01-19 RX ORDER — GABAPENTIN 400 MG/1
1 CAPSULE ORAL
Qty: 0 | Refills: 0 | DISCHARGE

## 2021-01-19 RX ORDER — SODIUM CHLORIDE 9 MG/ML
500 INJECTION INTRAMUSCULAR; INTRAVENOUS; SUBCUTANEOUS
Refills: 0 | Status: DISCONTINUED | OUTPATIENT
Start: 2021-01-19 | End: 2021-01-19

## 2021-01-19 RX ORDER — ATORVASTATIN CALCIUM 80 MG/1
1 TABLET, FILM COATED ORAL
Qty: 0 | Refills: 0 | DISCHARGE

## 2021-01-19 RX ORDER — ASPIRIN/CALCIUM CARB/MAGNESIUM 324 MG
1 TABLET ORAL
Qty: 0 | Refills: 0 | DISCHARGE

## 2021-01-19 RX ORDER — CLOPIDOGREL BISULFATE 75 MG/1
600 TABLET, FILM COATED ORAL ONCE
Refills: 0 | Status: COMPLETED | OUTPATIENT
Start: 2021-01-19 | End: 2021-01-19

## 2021-01-19 RX ORDER — SODIUM CHLORIDE 9 MG/ML
500 INJECTION INTRAMUSCULAR; INTRAVENOUS; SUBCUTANEOUS
Refills: 0 | Status: DISCONTINUED | OUTPATIENT
Start: 2021-01-19 | End: 2021-02-02

## 2021-01-19 RX ORDER — METOPROLOL TARTRATE 50 MG
5 TABLET ORAL ONCE
Refills: 0 | Status: DISCONTINUED | OUTPATIENT
Start: 2021-01-19 | End: 2021-01-19

## 2021-01-19 RX ORDER — MELOXICAM 15 MG/1
1 TABLET ORAL
Qty: 0 | Refills: 0 | DISCHARGE

## 2021-01-19 RX ADMIN — SODIUM CHLORIDE 75 MILLILITER(S): 9 INJECTION INTRAMUSCULAR; INTRAVENOUS; SUBCUTANEOUS at 09:23

## 2021-01-19 RX ADMIN — Medication 25 MILLIGRAM(S): at 11:10

## 2021-01-19 RX ADMIN — Medication 81 MILLIGRAM(S): at 09:22

## 2021-01-19 RX ADMIN — CLOPIDOGREL BISULFATE 600 MILLIGRAM(S): 75 TABLET, FILM COATED ORAL at 09:22

## 2021-01-19 NOTE — PROGRESS NOTE ADULT - SUBJECTIVE AND OBJECTIVE BOX
NOT COMPLETE    Interventional Cardiology PA SDA Discharge Note    Patient without complaints. Ambulated and voided without difficulties    Afebrile, VSS    Ext:    		  		Right   Radial :  no  hematoma,  no   bleeding, dressing; C/D/I      Pulses:    intact RAD to baseline     A/P:      70yo Female with PMHx HTN, HLD, Pancolitis 2013, Osteoporosis, mild to moderate AR and GERD who presented to cardiologist c/o CCS Angina Class III-IV Symptoms and abnormal nuclear stress test patient now presents for cardiac catheterization with possible intervention if clinically indicated.  Patient is s/p diagnostic cardiac catheterization 1/19 revealing normal coronary arteries.  Intraprocedure complicated by new onset Afib w/ RVR s/p Lopressor 5mg IV x3 and PO metoprolol post procedure.      1.	Stable for discharge as per attending  ___Bethel______ after bed rest, pt voids, groin/wrist stable and 30 minutes of ambulation.  2.	Follow-up with PMD/Cardiologist ____Bethel_______ in 1-2 weeks  3.	Discharged forms signed and copies in chart    NOT COMPLETE    Interventional Cardiology PA SDA Discharge Note    Patient without complaints. Ambulated and voided without difficulties    Afebrile, VSS    Ext:    		  		Right   Radial :  no  hematoma,  no   bleeding, dressing; C/D/I      Pulses:    intact RAD to baseline     A/P:      70yo Female with PMHx HTN, HLD, Pancolitis 2013, Osteoporosis, mild to moderate AR and GERD who presented to cardiologist c/o CCS Angina Class III-IV Symptoms and abnormal nuclear stress test patient now presents for cardiac catheterization with possible intervention if clinically indicated.  Patient is s/p diagnostic cardiac catheterization 1/19 revealing normal coronary arteries.  Intraprocedure complicated by new onset Afib w/ RVR s/p Lopressor 5mg IV x3 and metoprolol 25mg PO x1 post procedure and patient self converted back to NSR w/ HR in 60s.  Dr. Hugo aware and patient deemed stable for discharge home without anticoagulation or rate control medications as patient is baseline bradycardic.  She is recommended to follow up for medical management.       1.	Stable for discharge as per attending  ___Bethel______ after bed rest, pt voids, groin/wrist stable and 30 minutes of ambulation.  2.	Follow-up with PMD/Cardiologist ____Bethel_______ in 1-2 weeks  3.	Discharged forms signed and copies in chart

## 2021-01-20 DIAGNOSIS — Z01.818 ENCOUNTER FOR OTHER PREPROCEDURAL EXAMINATION: ICD-10-CM

## 2021-01-21 ENCOUNTER — APPOINTMENT (OUTPATIENT)
Dept: DISASTER EMERGENCY | Facility: CLINIC | Age: 71
End: 2021-01-21

## 2021-01-22 ENCOUNTER — APPOINTMENT (OUTPATIENT)
Dept: DISASTER EMERGENCY | Facility: CLINIC | Age: 71
End: 2021-01-22

## 2021-01-22 PROBLEM — M81.0 AGE-RELATED OSTEOPOROSIS WITHOUT CURRENT PATHOLOGICAL FRACTURE: Chronic | Status: ACTIVE | Noted: 2021-01-19

## 2021-01-22 PROBLEM — K21.9 GASTRO-ESOPHAGEAL REFLUX DISEASE WITHOUT ESOPHAGITIS: Chronic | Status: ACTIVE | Noted: 2021-01-19

## 2021-01-22 PROBLEM — E78.5 HYPERLIPIDEMIA, UNSPECIFIED: Chronic | Status: ACTIVE | Noted: 2021-01-19

## 2021-01-22 PROBLEM — I10 ESSENTIAL (PRIMARY) HYPERTENSION: Chronic | Status: ACTIVE | Noted: 2021-01-19

## 2021-01-24 ENCOUNTER — TRANSCRIPTION ENCOUNTER (OUTPATIENT)
Age: 71
End: 2021-01-24

## 2021-01-24 LAB — SARS-COV-2 N GENE NPH QL NAA+PROBE: NOT DETECTED

## 2021-01-25 ENCOUNTER — OUTPATIENT (OUTPATIENT)
Dept: OUTPATIENT SERVICES | Facility: HOSPITAL | Age: 71
LOS: 1 days | End: 2021-01-25
Payer: MEDICARE

## 2021-01-25 ENCOUNTER — APPOINTMENT (OUTPATIENT)
Dept: NEUROSURGERY | Facility: CLINIC | Age: 71
End: 2021-01-25
Payer: MEDICARE

## 2021-01-25 DIAGNOSIS — M54.16 RADICULOPATHY, LUMBAR REGION: ICD-10-CM

## 2021-01-25 DIAGNOSIS — H26.9 UNSPECIFIED CATARACT: Chronic | ICD-10-CM

## 2021-01-25 PROCEDURE — 62323 NJX INTERLAMINAR LMBR/SAC: CPT

## 2021-02-24 ENCOUNTER — APPOINTMENT (OUTPATIENT)
Dept: NEUROSURGERY | Facility: CLINIC | Age: 71
End: 2021-02-24
Payer: MEDICARE

## 2021-02-24 VITALS
WEIGHT: 104 LBS | SYSTOLIC BLOOD PRESSURE: 116 MMHG | BODY MASS INDEX: 24.07 KG/M2 | HEART RATE: 71 BPM | HEIGHT: 55 IN | DIASTOLIC BLOOD PRESSURE: 71 MMHG

## 2021-02-24 PROCEDURE — 99214 OFFICE O/P EST MOD 30 MIN: CPT

## 2021-02-24 NOTE — REASON FOR VISIT
[Follow-Up: _____] : a [unfilled] follow-up visit [FreeTextEntry1] : Patient returns after her most recent LESI.  She felt 50% improved for about 2 weeks but then the pain started to return.  She did use Tylenol for her pain relief.  She is here to discuss.

## 2021-02-24 NOTE — ASSESSMENT
[FreeTextEntry1] : 70 year old female with low back and lumbar radicular pain only temporarily improved following her most recent LESI.  She is agreeable to a repeat injection and will follow up with me next week for her procedure.

## 2021-03-03 NOTE — ED PROVIDER NOTE - NO SIGNIFICANT PAST SURGICAL HISTORY
[address] [facility]  PHYSICAL THERAPY  [] EVALUATION  [x] DAILY NOTE (LAND) [] DAILY NOTE (AQUATIC ) [] PROGRESS NOTE [] DISCHARGE NOTE    [x] OUTPATIENT REHABILITATION CENTER [facility]   [] [place] [id number]    [] [facility]   [] [facility]    Date: 3/3/2021  Patient Name:  Furman Leventhal  : 1952  MRN: [id number]  CSN: [id number]    Referring Practitioner Cristian Lawson MD   Diagnosis Lumbago with sciatica, right side [M54.41]    Treatment Diagnosis Acute on chronic right lumbar pain with sciatica, core and hip weakness, leg length discrepancy   Date of Evaluation 21    Additional Pertinent History HTN, osteoporosis. Functional Outcome Measure Used Oswestry   Functional Outcome Score = 18% (21)       Insurance: Primary: Payor: Kellen People /  /  / ,   Secondary:    Authorization Information: Aquatics and modalities covered except ionto, NO telehealth   Visit # 2, 2/10 for progress note   Visits Allowed: Unlimited    Recertification Date:    Physician Follow-Up: 21   Physician Orders:    History of Present Illness: 4 years ago, pt had XR which showed degeneration of SIJ, so had injections which helped. Pain returned a year ago, took Aleve and went away in 10 days. Now in January to developed pain in right hip with tingling in foot. Pt does yoga which helps, denies doing any prior PT for this issue. SUBJECTIVE: Patient denies any adverse effects following last treatment session but does question if her heel lift is too high.      TREATMENT   Precautions:    Pain: 4/10 right hip/thigh    X in shaded column indicates activity completed today   Modalities Parameters/  Location  Notes                     Manual Therapy Time/Technique  Notes   Long leg traction right 2x30 sec x    Piriformis release right x5 min x Less thigh pain         Exercise/Intervention   Notes   Nustep warm up no arms  4 minutes   X    Supine hamstring stretch with strap 2x30\"  X Cues for knee extension   Supine piriformis figure 4 stretch 2x30\"  X           Ab brace  10x3\" 10 minutes X With instruction on self hand placements, isolation of TrA and breathing patterns     Ab brace with ball squeeze and ab brace with hip abduction 10x3\" orange X                                         Specific Interventions Next Treatment: continue trigger point release to piriformis, piriformis and hamstring stretching, core stabilization    Activity/Treatment Tolerance:  [x]  Patient tolerated treatment well  []  Patient limited by fatigue  []  Patient limited by pain   []  Patient limited by medical complications  []  Other:     Assessment: Advised patient she may take one layer out of her heel lift. Patient tolerates today's treatment session and exercise progressions well without exacerbating symptoms. GOALS:  Patient Goal: Decrease right leg pain    Short Term Goals:  Time Frame: 6 weeks  Patient will report reduced pain to 3/10 to tolerate sitting and laying down to sleep. Patient will demonstrate good core engagement during therapy exercises without cues  to carry over to functional activities, lifting, and housework. Patient will improve B hip strength to 5/5 for stabilization when walking, lifting, and cleaning. Patient will deny right piriformis tenderness to decrease radicular pain for sitting and sleeping. Long Term Goals:  Time Frame: 12 weeks  Patient will be independent and compliant with HEP daily to achieve above goals. Pt will reduce Oswestry score from 18% to 10% to tolerate sitting and sleeping. Patient Education:   []  HEP/Education Completed: progressions added reflective of today's progressions    Ed[employer] Access Code:  []  No new Education completed  []  Reviewed Prior HEP      [x]  Patient verbalized and/or demonstrated understanding of education provided.   []  Patient unable to verbalize and/or demonstrate understanding of education <<----- Click to add NO significant Past Surgical History

## 2021-06-17 NOTE — H&P ADULT - NSHPSOCIALHISTORY_GEN_ALL_CORE
Telephone Encounter by Flower Nguyen LPN at 4/13/2020 12:54 PM     Author: Flower Nguyen LPN Service: -- Author Type: Licensed Nurse    Filed: 4/13/2020 12:57 PM Encounter Date: 4/1/2020 Status: Signed    : Flower Nguyen LPN (Licensed Nurse)       Who is calling:  Patient daughter Edilia Simpson   Reason for Call:  Caller is calling in to check the status of hospital bed order request . Writer relayed below message .  Date of last appointment with primary care: 03/26/20  Okay to leave a detailed message  Matilda Swenson MD   to Matilda Swenson Care Team Pool          2:19 PM   Note      Ok for orders                Denies tobacco use, illicit drug use, or alcohol use.

## 2021-09-14 ENCOUNTER — APPOINTMENT (OUTPATIENT)
Dept: NEUROSURGERY | Facility: CLINIC | Age: 71
End: 2021-09-14
Payer: MEDICARE

## 2021-09-14 PROCEDURE — 99214 OFFICE O/P EST MOD 30 MIN: CPT

## 2021-09-14 NOTE — ASSESSMENT
[FreeTextEntry1] : 70 year old female with low back and lumbar radicular pain now returned.  She is agreeable to a repeat injection.  She will follow up with me next week for her procedure.

## 2021-09-14 NOTE — REASON FOR VISIT
[Follow-Up: _____] : a [unfilled] follow-up visit [FreeTextEntry1] : Patient returns after being seen in February.  She states that her pain has returned.  She is here to discuss a repeat injection.

## 2021-09-17 ENCOUNTER — APPOINTMENT (OUTPATIENT)
Dept: DISASTER EMERGENCY | Facility: CLINIC | Age: 71
End: 2021-09-17

## 2021-09-19 ENCOUNTER — TRANSCRIPTION ENCOUNTER (OUTPATIENT)
Age: 71
End: 2021-09-19

## 2021-09-20 ENCOUNTER — APPOINTMENT (OUTPATIENT)
Dept: NEUROSURGERY | Facility: CLINIC | Age: 71
End: 2021-09-20
Payer: MEDICARE

## 2021-09-20 ENCOUNTER — OUTPATIENT (OUTPATIENT)
Dept: OUTPATIENT SERVICES | Facility: HOSPITAL | Age: 71
LOS: 1 days | End: 2021-09-20
Payer: MEDICARE

## 2021-09-20 DIAGNOSIS — H26.9 UNSPECIFIED CATARACT: Chronic | ICD-10-CM

## 2021-09-20 DIAGNOSIS — M54.16 RADICULOPATHY, LUMBAR REGION: ICD-10-CM

## 2021-09-20 LAB — SARS-COV-2 N GENE NPH QL NAA+PROBE: NOT DETECTED

## 2021-09-20 PROCEDURE — 62323 NJX INTERLAMINAR LMBR/SAC: CPT

## 2021-10-05 ENCOUNTER — APPOINTMENT (OUTPATIENT)
Dept: NEUROSURGERY | Facility: CLINIC | Age: 71
End: 2021-10-05
Payer: MEDICARE

## 2021-10-05 PROCEDURE — 99214 OFFICE O/P EST MOD 30 MIN: CPT

## 2021-10-05 NOTE — PHYSICAL EXAM
[General Appearance - Alert] : alert [FreeTextEntry1] : no redness, swelling, or tenderness at the injection site

## 2021-10-05 NOTE — REASON FOR VISIT
[Follow-Up: _____] : a [unfilled] follow-up visit [FreeTextEntry1] : Patient returns after the first LESI in this series a few weeks ago.  The patient reports 50% improvement in the symptoms.  The patient is pleased with the results and returns for a follow up visit today.\par

## 2021-10-05 NOTE — ASSESSMENT
[FreeTextEntry1] : 71 year old female with low back and lumbar radicular pain now moderately improved following her most recent LESI.  She is agreeable to a repeat injection and will follow up with me in 2 weeks for her procedure.

## 2021-10-08 ENCOUNTER — APPOINTMENT (OUTPATIENT)
Dept: DISASTER EMERGENCY | Facility: CLINIC | Age: 71
End: 2021-10-08

## 2021-10-15 ENCOUNTER — APPOINTMENT (OUTPATIENT)
Dept: DISASTER EMERGENCY | Facility: CLINIC | Age: 71
End: 2021-10-15

## 2021-10-15 ENCOUNTER — LABORATORY RESULT (OUTPATIENT)
Age: 71
End: 2021-10-15

## 2021-10-17 ENCOUNTER — TRANSCRIPTION ENCOUNTER (OUTPATIENT)
Age: 71
End: 2021-10-17

## 2021-10-18 ENCOUNTER — OUTPATIENT (OUTPATIENT)
Dept: OUTPATIENT SERVICES | Facility: HOSPITAL | Age: 71
LOS: 1 days | End: 2021-10-18
Payer: MEDICARE

## 2021-10-18 ENCOUNTER — APPOINTMENT (OUTPATIENT)
Dept: NEUROSURGERY | Facility: CLINIC | Age: 71
End: 2021-10-18
Payer: MEDICARE

## 2021-10-18 DIAGNOSIS — M54.16 RADICULOPATHY, LUMBAR REGION: ICD-10-CM

## 2021-10-18 DIAGNOSIS — H26.9 UNSPECIFIED CATARACT: Chronic | ICD-10-CM

## 2021-10-18 PROCEDURE — 62323 NJX INTERLAMINAR LMBR/SAC: CPT

## 2023-02-03 NOTE — ED PROVIDER NOTE - DISPOSITION TYPE
Pt here for Pleurex drainage. Used Pt supplies. Removed 2500ml. Yellow non-transparent, no blood noted. Site had scant/minimal drainage, sutures intact. Draining aborted after pt had sharp pain in lower pelvic region. It subsided quickly after stopping. Site dressed w/supplied dressing kit.    Pt did report lightheadness when getting out of bed. She moved slowly and appropriately. Granddaughter used wheelchair to take pt to car. VSS, documented.   OBSERVATION ADMIT

## 2023-04-19 ENCOUNTER — APPOINTMENT (OUTPATIENT)
Dept: PHYSICAL MEDICINE AND REHAB | Facility: CLINIC | Age: 73
End: 2023-04-19
Payer: MEDICARE

## 2023-04-19 VITALS
HEART RATE: 58 BPM | DIASTOLIC BLOOD PRESSURE: 64 MMHG | OXYGEN SATURATION: 99 % | SYSTOLIC BLOOD PRESSURE: 116 MMHG | TEMPERATURE: 97.2 F

## 2023-04-19 PROCEDURE — 99214 OFFICE O/P EST MOD 30 MIN: CPT

## 2023-04-19 NOTE — ASSESSMENT
[FreeTextEntry1] : 72 year old female with low back and lumbar radicular pain now returned.  She will follow up with me next week for her procedure.

## 2023-04-19 NOTE — HISTORY OF PRESENT ILLNESS
[FreeTextEntry1] : Patient returns after being seen in October 2021.  She states that her pain has returned and she is interested in a repeat epidural injection.  Her pain intensity in 10/10.

## 2023-04-27 ENCOUNTER — APPOINTMENT (OUTPATIENT)
Dept: PHYSICAL MEDICINE AND REHAB | Facility: CLINIC | Age: 73
End: 2023-04-27
Payer: MEDICARE

## 2023-04-27 ENCOUNTER — OUTPATIENT (OUTPATIENT)
Dept: OUTPATIENT SERVICES | Facility: HOSPITAL | Age: 73
LOS: 1 days | End: 2023-04-27
Payer: MEDICARE

## 2023-04-27 DIAGNOSIS — M54.16 RADICULOPATHY, LUMBAR REGION: ICD-10-CM

## 2023-04-27 DIAGNOSIS — H26.9 UNSPECIFIED CATARACT: Chronic | ICD-10-CM

## 2023-04-27 PROCEDURE — 62323 NJX INTERLAMINAR LMBR/SAC: CPT

## 2023-04-27 NOTE — PROCEDURE
[de-identified] : [facility]\par PAIN MANAGEMENT PROCEDURAL CENTER\par [address], [place], [address] - [phone]\par \par PATIENT: EMI LEHMAN \par MEDICAL RECORD #:\par DATE OF OPERATION: 04/27/2023 \par PREOPERATIVE DIAGNOSIS:  LUMBAR RADICULAR PAIN\par POSTOPERATIVE DIAGNOSIS:  LUMBAR RADICULAR PAIN\par PROCEDURE: LUMBAR EPIDURAL STEROID INJECTION UNDER X-RAY GUIDANCE\par SURGEON:  KEYLA JIMENEZ M.D.\par ANESTHEISA:  LOCAL\par EBL:  MINIMAL\par \par INDICATIONS:  The patient returns to Pain Management with persistent lower back pain with radiation down the right leg.  The pain has remained quite significant and interferes with the patient’s ability to perform ADLs despite the implementation of other conservative measures.  I discussed with the patient at length the risks, benefits, and expectations of the aforementioned procedure.  All of the patient’s questions were answered.  The patient signed informed consent and agreed to proceed.\par \par PROCEDURE:  The patient was transported to the operating room, placed in the prone position and monitored non-invasively with stable vital signs and no complaints.  The patient’s back was prepped three times with betadine and draped in meticulous sterile fashion.  The L4-L5 interspace was identified fluoroscopically and the skin overlying this level was infiltrated with 5cc of 1% preservative-free lidocaine using a 25 gauge one inch needle.  The epidural space was approached and entered at this level with a 20 gauge Tuohy needle by loss of resistance technique.\par \par Following loss of resistance to air, aspiration was negative for CSF or heme.  Then, 2cc of Omnipaque 240 were injected and the epidurogram revealed spread from L4 to S1 in the posterior epidural space bilaterally with right-sided predominance and no distinct cutoff.  Depo-Medrol 80mg was then injected with 2cc of preservative-free 1% lidocaine.  The needle tract was flushed with 2cc of 1% lidocaine and the needle was removed.  A sterile bandage was applied.\par \par The patient tolerated the procedure well without complaint or complication.  The patient was observed in stable condition after the procedure for approximately 30 minutes before being discharged to home in the company of an adult.  The patient was provided with full written instructions.  The patient will be seen for follow-up in my office in 1 week but certainly sooner with any questions or concerns.\par \par \par \par Keyla Jimenez M.D.\par \par \par

## 2023-05-10 ENCOUNTER — APPOINTMENT (OUTPATIENT)
Dept: PHYSICAL MEDICINE AND REHAB | Facility: CLINIC | Age: 73
End: 2023-05-10
Payer: MEDICARE

## 2023-05-10 VITALS
HEART RATE: 65 BPM | DIASTOLIC BLOOD PRESSURE: 73 MMHG | TEMPERATURE: 97.3 F | OXYGEN SATURATION: 100 % | SYSTOLIC BLOOD PRESSURE: 135 MMHG

## 2023-05-10 PROCEDURE — 99214 OFFICE O/P EST MOD 30 MIN: CPT

## 2023-05-10 NOTE — ASSESSMENT
[FreeTextEntry1] : 72 year old female with low back pain and right-sided pain now improved but now with left-sided pain with radiation down her left leg.  She would like to proceed with another injection on the left side.  She will follow up with me next week for her procedure.

## 2023-05-15 ENCOUNTER — APPOINTMENT (OUTPATIENT)
Dept: PHYSICAL MEDICINE AND REHAB | Facility: CLINIC | Age: 73
End: 2023-05-15
Payer: MEDICARE

## 2023-05-15 ENCOUNTER — OUTPATIENT (OUTPATIENT)
Dept: OUTPATIENT SERVICES | Facility: HOSPITAL | Age: 73
LOS: 1 days | End: 2023-05-15
Payer: MEDICARE

## 2023-05-15 DIAGNOSIS — H26.9 UNSPECIFIED CATARACT: Chronic | ICD-10-CM

## 2023-05-15 DIAGNOSIS — M54.16 RADICULOPATHY, LUMBAR REGION: ICD-10-CM

## 2023-05-15 PROCEDURE — 62323 NJX INTERLAMINAR LMBR/SAC: CPT

## 2023-05-15 NOTE — PROCEDURE
[de-identified] : [facility]\par PAIN MANAGEMENT PROCEDURAL CENTER\par [address] [place], [place], [address] - [phone]\par \par PATIENT: EMI LEHMAN \par MEDICAL RECORD #: \par DATE OF OPERATION: 05/15/2023 \par PREOPERATIVE DIAGNOSIS:  LUMBAR RADICULAR PAIN\par POSTOPERATIVE DIAGNOSIS:  LUMBAR RADICULAR PAIN\par PROCEDURE: LUMBAR EPIDURAL STEROID INJECTION UNDER X-RAY GUIDANCE\par SURGEON:  KEYLA JIMENEZ M.D.\par ANESTHEISA:  LOCAL\par EBL:  MINIMAL\par \par INDICATIONS:  The patient returns to Pain Management with persistent lower back pain with radiation down the left leg.  The pain has remained quite significant and interferes with the patient’s ability to perform ADLs despite the implementation of other conservative measures.  I discussed with the patient at length the risks, benefits, and expectations of the aforementioned procedure.  All of the patient’s questions were answered.  The patient signed informed consent and agreed to proceed.\par \par PROCEDURE:  The patient was transported to the operating room, placed in the prone position and monitored non-invasively with stable vital signs and no complaints.  The patient’s back was prepped three times with betadine and draped in meticulous sterile fashion.  The L4-L5 interspace was identified fluoroscopically and the skin overlying this level was infiltrated with 5cc of 1% preservative-free lidocaine using a 25 gauge one inch needle.  The epidural space was approached and entered at this level with a 20 gauge Tuohy needle by loss of resistance technique.\par \par Following loss of resistance to air, aspiration was negative for CSF or heme.  Then, 2cc of Omnipaque 240 were injected and the epidurogram revealed spread from L4 to S1 in the posterior epidural space bilaterally with left-sided predominance and no distinct cutoff.  Depo-Medrol 80mg was then injected with 1cc of preservative-free 1% lidocaine.  The needle tract was flushed with 2cc of 1% lidocaine and the needle was removed.  A sterile bandage was applied.\par \par The patient tolerated the procedure well without complaint or complication.  The patient was observed in stable condition after the procedure for approximately 30 minutes before being discharged to home in the company of an adult.  The patient was provided with full written instructions.  The patient will be seen for follow-up in my office in 1 week but certainly sooner with any questions or concerns.\par \par \par \par Keyla Jimenez M.D.\par \par \par

## 2023-05-26 ENCOUNTER — APPOINTMENT (OUTPATIENT)
Dept: PHYSICAL MEDICINE AND REHAB | Facility: CLINIC | Age: 73
End: 2023-05-26
Payer: MEDICARE

## 2023-05-26 VITALS — OXYGEN SATURATION: 100 % | HEART RATE: 54 BPM | DIASTOLIC BLOOD PRESSURE: 60 MMHG | SYSTOLIC BLOOD PRESSURE: 123 MMHG

## 2023-05-26 PROCEDURE — 99213 OFFICE O/P EST LOW 20 MIN: CPT

## 2023-05-26 NOTE — HISTORY OF PRESENT ILLNESS
[FreeTextEntry1] : Patient returns after the second LESI a few weeks ago.  The patient reports 50% improvement in the symptoms.  The patient is pleased with the results and returns for a follow up visit today.\par

## 2023-05-26 NOTE — ASSESSMENT
[FreeTextEntry1] : 72 year old female with low back and lumbar radicular pain now moderately improved following her second LESI.  She will continue with her home exercises and return to see me at the earliest sign that her pain returns for further injection therapy as necessary.

## 2023-09-07 ENCOUNTER — OUTPATIENT (OUTPATIENT)
Dept: OUTPATIENT SERVICES | Facility: HOSPITAL | Age: 73
LOS: 1 days | End: 2023-09-07
Payer: MEDICARE

## 2023-09-07 ENCOUNTER — APPOINTMENT (OUTPATIENT)
Dept: PHYSICAL MEDICINE AND REHAB | Facility: CLINIC | Age: 73
End: 2023-09-07
Payer: MEDICARE

## 2023-09-07 DIAGNOSIS — H26.9 UNSPECIFIED CATARACT: Chronic | ICD-10-CM

## 2023-09-07 DIAGNOSIS — M54.16 RADICULOPATHY, LUMBAR REGION: ICD-10-CM

## 2023-09-07 PROCEDURE — 62323 NJX INTERLAMINAR LMBR/SAC: CPT

## 2023-09-07 NOTE — PROCEDURE
[de-identified] : [facility] PAIN MANAGEMENT PROC[facility]URAL C[facility] [address] [place], [place], [address] - [phone]  PATIENT: EMI LEHMAN  MEDICAL RECORD #: DATE OF OPERATION: 09/07/2023  PREOPERATIVE DIAGNOSIS:  LUMBAR RADICULAR PAIN POSTOPERATIVE DIAGNOSIS:  LUMBAR RADICULAR PAIN PROCEDURE: LUMBAR EPIDURAL STEROID INJECTION UNDER X-RAY GUIDANCE SURGEON:  KEYLA JIMENEZ M.D. ANESTHEISA:  LOCAL EBL:  MINIMAL  INDICATIONS:  The patient returns to Pain Management with persistent lower back pain with radiation down the right leg.  The pain has remained quite significant and interferes with the patients ability to perform ADLs despite the implementation of other conservative measures.  I discussed with the patient at length the risks, benefits, and expectations of the aforementioned procedure.  All of the patients questions were answered.  The patient signed informed consent and agreed to proceed.  PROCEDURE:  The patient was transported to the operating room, placed in the prone position and monitored non-invasively with stable vital signs and no complaints.  The patients back was prepped three times with betadine and draped in meticulous sterile fashion.  The L4-L5 interspace was identified fluoroscopically and the skin overlying this level was infiltrated with 5cc of 1% preservative-free lidocaine using a 25 gauge one inch needle.  The epidural space was approached and entered at this level with a 20 gauge Tuohy needle by loss of resistance technique.  Following loss of resistance to air, aspiration was negative for CSF or heme.  Then, 2cc of Omnipaque 240 were injected and the epidurogram revealed spread from L4 to S1 in the posterior epidural space bilaterally with right-sided predominance and no distinct cutoff.  Depo-Medrol 80mg was then injected with 2cc of preservative-free 1% lidocaine.  The needle tract was flushed with 2cc of 1% lidocaine and the needle was removed.  A sterile bandage was applied.  The patient tolerated the procedure well without complaint or complication.  The patient was observed in stable condition after the procedure for approximately 30 minutes before being discharged to home in the company of an adult.  The patient was provided with full written instructions.  The patient will be seen for follow-up in my office in 1 week but certainly sooner with any questions or concerns.    Keyla Jimenez M.D.

## 2023-09-19 ENCOUNTER — APPOINTMENT (OUTPATIENT)
Dept: PHYSICAL MEDICINE AND REHAB | Facility: CLINIC | Age: 73
End: 2023-09-19
Payer: MEDICARE

## 2023-09-19 PROCEDURE — 99214 OFFICE O/P EST MOD 30 MIN: CPT

## 2023-09-19 RX ORDER — IBUPROFEN 600 MG/1
600 TABLET, FILM COATED ORAL TWICE DAILY
Qty: 60 | Refills: 1 | Status: ACTIVE | COMMUNITY
Start: 2023-09-19 | End: 1900-01-01

## 2023-10-26 ENCOUNTER — APPOINTMENT (OUTPATIENT)
Dept: PHYSICAL MEDICINE AND REHAB | Facility: CLINIC | Age: 73
End: 2023-10-26
Payer: MEDICARE

## 2023-10-26 ENCOUNTER — OUTPATIENT (OUTPATIENT)
Dept: OUTPATIENT SERVICES | Facility: HOSPITAL | Age: 73
LOS: 1 days | End: 2023-10-26
Payer: MEDICARE

## 2023-10-26 DIAGNOSIS — M54.16 RADICULOPATHY, LUMBAR REGION: ICD-10-CM

## 2023-10-26 DIAGNOSIS — H26.9 UNSPECIFIED CATARACT: Chronic | ICD-10-CM

## 2023-10-26 PROCEDURE — 62323 NJX INTERLAMINAR LMBR/SAC: CPT

## 2023-11-07 ENCOUNTER — APPOINTMENT (OUTPATIENT)
Dept: PHYSICAL MEDICINE AND REHAB | Facility: CLINIC | Age: 73
End: 2023-11-07
Payer: MEDICARE

## 2023-11-07 PROCEDURE — 99214 OFFICE O/P EST MOD 30 MIN: CPT

## 2023-11-13 ENCOUNTER — APPOINTMENT (OUTPATIENT)
Dept: PHYSICAL MEDICINE AND REHAB | Facility: CLINIC | Age: 73
End: 2023-11-13
Payer: MEDICARE

## 2023-11-13 ENCOUNTER — OUTPATIENT (OUTPATIENT)
Dept: OUTPATIENT SERVICES | Facility: HOSPITAL | Age: 73
LOS: 1 days | End: 2023-11-13
Payer: MEDICARE

## 2023-11-13 DIAGNOSIS — H26.9 UNSPECIFIED CATARACT: Chronic | ICD-10-CM

## 2023-11-13 DIAGNOSIS — M54.16 RADICULOPATHY, LUMBAR REGION: ICD-10-CM

## 2023-11-13 PROCEDURE — 62323 NJX INTERLAMINAR LMBR/SAC: CPT

## 2023-11-28 ENCOUNTER — APPOINTMENT (OUTPATIENT)
Dept: PHYSICAL MEDICINE AND REHAB | Facility: CLINIC | Age: 73
End: 2023-11-28
Payer: MEDICARE

## 2023-11-28 DIAGNOSIS — M54.16 RADICULOPATHY, LUMBAR REGION: ICD-10-CM

## 2023-11-28 DIAGNOSIS — M51.26 OTHER INTERVERTEBRAL DISC DISPLACEMENT, LUMBAR REGION: ICD-10-CM

## 2023-11-28 DIAGNOSIS — M51.36 OTHER INTERVERTEBRAL DISC DEGENERATION, LUMBAR REGION: ICD-10-CM

## 2023-11-28 DIAGNOSIS — M43.16 SPONDYLOLISTHESIS, LUMBAR REGION: ICD-10-CM

## 2023-11-28 PROCEDURE — 99213 OFFICE O/P EST LOW 20 MIN: CPT

## 2024-02-27 DIAGNOSIS — M54.9 DORSALGIA, UNSPECIFIED: ICD-10-CM

## 2024-03-11 NOTE — ED CDU PROVIDER INITIAL DAY NOTE - RESPIRATORY NEGATIVE STATEMENT, MLM
no chest pain, no cough, and no shortness of breath.
Patient requests all Lab, Cardiology, and Radiology Results on their Discharge Instructions

## 2024-04-11 DIAGNOSIS — M54.2 CERVICALGIA: ICD-10-CM

## 2024-08-16 NOTE — ED ADULT TRIAGE NOTE - SOURCE OF INFORMATION
No fractures to her wrist, elbow or her nose.  She does have some fluid to her elbow that might hide a fracture.  I recommend that she follows up with her doctor in 7 to 10 days for reevaluation of that elbow.    Use the Ace wrap and the wrist brace.  Tylenol or ibuprofen as needed for pain.  Apply ice packs to the areas that hurt.    Return to urgent care or emergency department for any worsening or concerning symptoms.  
Patient

## 2024-09-07 ENCOUNTER — EMERGENCY (EMERGENCY)
Facility: HOSPITAL | Age: 74
LOS: 1 days | Discharge: HOME | End: 2024-09-07
Attending: EMERGENCY MEDICINE
Payer: MEDICARE

## 2024-09-07 VITALS
OXYGEN SATURATION: 100 % | TEMPERATURE: 98 F | HEIGHT: 59 IN | SYSTOLIC BLOOD PRESSURE: 112 MMHG | WEIGHT: 91.93 LBS | RESPIRATION RATE: 18 BRPM | DIASTOLIC BLOOD PRESSURE: 69 MMHG | HEART RATE: 78 BPM

## 2024-09-07 DIAGNOSIS — H26.9 UNSPECIFIED CATARACT: Chronic | ICD-10-CM

## 2024-09-07 LAB
ALBUMIN SERPL ELPH-MCNC: 3.7 G/DL — SIGNIFICANT CHANGE UP (ref 3.3–5)
ALP SERPL-CCNC: 70 U/L — SIGNIFICANT CHANGE UP (ref 40–120)
ALT FLD-CCNC: 18 U/L — SIGNIFICANT CHANGE UP (ref 10–45)
ANION GAP SERPL CALC-SCNC: 11 MMOL/L — SIGNIFICANT CHANGE UP (ref 5–17)
APTT BLD: 28.5 SEC — SIGNIFICANT CHANGE UP (ref 24.5–35.6)
AST SERPL-CCNC: 22 U/L — SIGNIFICANT CHANGE UP (ref 10–40)
BASOPHILS # BLD AUTO: 0.02 K/UL — SIGNIFICANT CHANGE UP (ref 0–0.2)
BASOPHILS NFR BLD AUTO: 0.2 % — SIGNIFICANT CHANGE UP (ref 0–2)
BILIRUB SERPL-MCNC: 0.5 MG/DL — SIGNIFICANT CHANGE UP (ref 0.2–1.2)
BUN SERPL-MCNC: 13 MG/DL — SIGNIFICANT CHANGE UP (ref 7–23)
CALCIUM SERPL-MCNC: 9.5 MG/DL — SIGNIFICANT CHANGE UP (ref 8.4–10.5)
CHLORIDE SERPL-SCNC: 109 MMOL/L — HIGH (ref 96–108)
CO2 SERPL-SCNC: 24 MMOL/L — SIGNIFICANT CHANGE UP (ref 22–31)
CREAT SERPL-MCNC: 0.52 MG/DL — SIGNIFICANT CHANGE UP (ref 0.5–1.3)
EGFR: 97 ML/MIN/1.73M2 — SIGNIFICANT CHANGE UP
EOSINOPHIL # BLD AUTO: 0.06 K/UL — SIGNIFICANT CHANGE UP (ref 0–0.5)
EOSINOPHIL NFR BLD AUTO: 0.5 % — SIGNIFICANT CHANGE UP (ref 0–6)
GLUCOSE SERPL-MCNC: 98 MG/DL — SIGNIFICANT CHANGE UP (ref 70–99)
HCT VFR BLD CALC: 30.3 % — LOW (ref 34.5–45)
HGB BLD-MCNC: 9.6 G/DL — LOW (ref 11.5–15.5)
IMM GRANULOCYTES NFR BLD AUTO: 0.8 % — SIGNIFICANT CHANGE UP (ref 0–0.9)
INR BLD: 0.97 RATIO — SIGNIFICANT CHANGE UP (ref 0.85–1.18)
LYMPHOCYTES # BLD AUTO: 1.93 K/UL — SIGNIFICANT CHANGE UP (ref 1–3.3)
LYMPHOCYTES # BLD AUTO: 16.8 % — SIGNIFICANT CHANGE UP (ref 13–44)
MCHC RBC-ENTMCNC: 29.8 PG — SIGNIFICANT CHANGE UP (ref 27–34)
MCHC RBC-ENTMCNC: 31.7 GM/DL — LOW (ref 32–36)
MCV RBC AUTO: 94.1 FL — SIGNIFICANT CHANGE UP (ref 80–100)
MONOCYTES # BLD AUTO: 0.65 K/UL — SIGNIFICANT CHANGE UP (ref 0–0.9)
MONOCYTES NFR BLD AUTO: 5.6 % — SIGNIFICANT CHANGE UP (ref 2–14)
NEUTROPHILS # BLD AUTO: 8.77 K/UL — HIGH (ref 1.8–7.4)
NEUTROPHILS NFR BLD AUTO: 76.1 % — SIGNIFICANT CHANGE UP (ref 43–77)
NRBC # BLD: 0 /100 WBCS — SIGNIFICANT CHANGE UP (ref 0–0)
PLATELET # BLD AUTO: 294 K/UL — SIGNIFICANT CHANGE UP (ref 150–400)
POTASSIUM SERPL-MCNC: 4 MMOL/L — SIGNIFICANT CHANGE UP (ref 3.5–5.3)
POTASSIUM SERPL-SCNC: 4 MMOL/L — SIGNIFICANT CHANGE UP (ref 3.5–5.3)
PROT SERPL-MCNC: 6.9 G/DL — SIGNIFICANT CHANGE UP (ref 6–8.3)
PROTHROM AB SERPL-ACNC: 10.7 SEC — SIGNIFICANT CHANGE UP (ref 9.5–13)
RBC # BLD: 3.22 M/UL — LOW (ref 3.8–5.2)
RBC # FLD: 15.8 % — HIGH (ref 10.3–14.5)
SODIUM SERPL-SCNC: 144 MMOL/L — SIGNIFICANT CHANGE UP (ref 135–145)
WBC # BLD: 11.52 K/UL — HIGH (ref 3.8–10.5)
WBC # FLD AUTO: 11.52 K/UL — HIGH (ref 3.8–10.5)

## 2024-09-07 PROCEDURE — 72125 CT NECK SPINE W/O DYE: CPT | Mod: MC

## 2024-09-07 PROCEDURE — 71250 CT THORAX DX C-: CPT | Mod: 26,MC

## 2024-09-07 PROCEDURE — 85610 PROTHROMBIN TIME: CPT

## 2024-09-07 PROCEDURE — 72125 CT NECK SPINE W/O DYE: CPT | Mod: 26,MC

## 2024-09-07 PROCEDURE — 70450 CT HEAD/BRAIN W/O DYE: CPT | Mod: 26,MC

## 2024-09-07 PROCEDURE — 85730 THROMBOPLASTIN TIME PARTIAL: CPT

## 2024-09-07 PROCEDURE — 71250 CT THORAX DX C-: CPT | Mod: MC

## 2024-09-07 PROCEDURE — 80053 COMPREHEN METABOLIC PANEL: CPT

## 2024-09-07 PROCEDURE — 99285 EMERGENCY DEPT VISIT HI MDM: CPT | Mod: FS

## 2024-09-07 PROCEDURE — 96374 THER/PROPH/DIAG INJ IV PUSH: CPT

## 2024-09-07 PROCEDURE — 96375 TX/PRO/DX INJ NEW DRUG ADDON: CPT

## 2024-09-07 PROCEDURE — 72128 CT CHEST SPINE W/O DYE: CPT | Mod: 26,MC

## 2024-09-07 PROCEDURE — 73030 X-RAY EXAM OF SHOULDER: CPT

## 2024-09-07 PROCEDURE — 99284 EMERGENCY DEPT VISIT MOD MDM: CPT | Mod: 25

## 2024-09-07 PROCEDURE — 73020 X-RAY EXAM OF SHOULDER: CPT | Mod: 26,XE,LT

## 2024-09-07 PROCEDURE — 85025 COMPLETE CBC W/AUTO DIFF WBC: CPT

## 2024-09-07 PROCEDURE — 70450 CT HEAD/BRAIN W/O DYE: CPT | Mod: MC

## 2024-09-07 PROCEDURE — 71045 X-RAY EXAM CHEST 1 VIEW: CPT

## 2024-09-07 PROCEDURE — 71045 X-RAY EXAM CHEST 1 VIEW: CPT | Mod: 26

## 2024-09-07 PROCEDURE — 73030 X-RAY EXAM OF SHOULDER: CPT | Mod: 26,LT

## 2024-09-07 PROCEDURE — 73020 X-RAY EXAM OF SHOULDER: CPT

## 2024-09-07 RX ORDER — ACETAMINOPHEN 325 MG/1
625 TABLET ORAL ONCE
Refills: 0 | Status: COMPLETED | OUTPATIENT
Start: 2024-09-07 | End: 2024-09-07

## 2024-09-07 RX ORDER — LIDOCAINE/BENZALKONIUM/ALCOHOL
1 SOLUTION, NON-ORAL TOPICAL ONCE
Refills: 0 | Status: COMPLETED | OUTPATIENT
Start: 2024-09-07 | End: 2024-09-07

## 2024-09-07 RX ADMIN — ACETAMINOPHEN 625 MILLIGRAM(S): 325 TABLET ORAL at 20:00

## 2024-09-07 RX ADMIN — ACETAMINOPHEN 625 MILLIGRAM(S): 325 TABLET ORAL at 18:58

## 2024-09-07 RX ADMIN — Medication 2 MILLIGRAM(S): at 18:18

## 2024-09-07 RX ADMIN — Medication 2 MILLIGRAM(S): at 19:00

## 2024-09-07 RX ADMIN — Medication 1 PATCH: at 18:17

## 2024-09-07 RX ADMIN — ACETAMINOPHEN 250 MILLIGRAM(S): 325 TABLET ORAL at 18:43

## 2024-09-07 RX ADMIN — Medication 1 PATCH: at 21:08

## 2024-09-07 NOTE — ED PROVIDER NOTE - PHYSICAL EXAMINATION
A&Ox3, NAD, well appearing  NCAT. PERRL, EOMI.  Neck supple, No vertebral ttp of the c/L spine, c-spine with FAROM. + thoracic ttp, + L posterior thoracic rib ttp, no ecchymosis.   Lungs CTAB. No w/r/r  Cardiac  RRR, no chest wall ttp or overlaying ecchymosis/abrasions/lacerations  Abd soft, NT/ND, no rebound or guarding.   Extremities: cap refill <2, pulses in distal extremities 4+, no edema.   Skin without ecchymosis, abrasions, lacerations  No focal Deficits, moving all extremities.

## 2024-09-07 NOTE — ED ADULT NURSE NOTE - OBJECTIVE STATEMENT
72 y/o female BIB EMS s/p fall while getting into a car and landing on her back, left arm shoulder and  hit her head. Bump noted to the back of her head. Complaints of left rib, back, left arm and shoulder and back of her head pain. Denies CP, SOB, dizziness, lightheadedness, N, V, numbness, tingling.

## 2024-09-07 NOTE — ED PROVIDER NOTE - WR ORDER NAME 2
BIBA and PD for increasing thoughts of SI and increasing depression. Hx of cutting herself and one admission to [facility]. Patient states that if she were to kill herself she would do it by cutting. Denies HI. Hx of sickle cell, denies pain at this time BIBA and PD for increasing thoughts of SI and increasing depression. Hx of cutting herself and one admission to [facility]. Patient states that if she were to kill herself she would do it by cutting. Denies HI. Hx of sickle cell, denies pain at this time.. 0418hrs:Addendum: Pt recd. from [facility]  reports had a Verbal Altercation with her parents, Claimed "They don't want her to be around". Dx with Depression on Zoloft Abilify ,compliant. Xray Shoulder 2 Views, Left

## 2024-09-07 NOTE — ED ADULT NURSE REASSESSMENT NOTE - NS ED NURSE REASSESS COMMENT FT1
Report taken from [facility]. Pt introduced to oncoming RN and updated on plan of care. pt is A&OX4, VSS. resting comfortably and no acute distress noted. Call bell in reach, pt and family educated on use. Bed locked and in lowest position. Denies any needs or complaints at this time. pending CT

## 2024-09-07 NOTE — ED PROVIDER NOTE - OBJECTIVE STATEMENT
73-year-old female with PMH HTN, HLD osteoporosis here for evaluation of left-sided shoulder pain and left anterior thoracic pain s/p mechanical fall this afternoon.  Patient was getting into a car when she lost her footing on the curb and fell onto her left hand side.  Endorses head straight but denies LOC.  She was unable to ambulate afterwards.  Denies any chest pain or shortness of breath, no difficulty breathing however states that is difficult to inspire deeply.  Any AC use.  Took Motrin however did not improve her discomfort.  No numbness or weakness. 73-year-old female with PMH HTN, HLD osteoporosis here for evaluation of left-sided shoulder pain and left anterior thoracic pain s/p mechanical fall this afternoon.  Patient was getting into a car when she lost her footing on the curb and fell onto her left hand side.  Endorses head straight but denies LOC.  She was unable to ambulate afterwards.  Denies any chest pain or shortness of breath, no difficulty breathing however states that is difficult to inspire deeply.  Any AC use.  Took Motrin however did not improve her discomfort.  No numbness or weakness.    Attendinyo female presents s/p left flank and shoulder pain s/p trip and fall.  no pelvic or hip pain.  no shortness of breath.

## 2024-09-07 NOTE — ED PROVIDER NOTE - PROGRESS NOTE DETAILS
Jose Luis Raya MD, PGY3  Labs and imaging nonactionable.  No rib fractures on CT as clinically suspected.  Patient able to ambulate in the emergency department.  Plan to discharge home.  Answered all questions and gave return precautions.

## 2024-09-07 NOTE — ED PROVIDER NOTE - CLINICAL SUMMARY MEDICAL DECISION MAKING FREE TEXT BOX
Attendin-year-old female presents status post mechanical trip and fall while getting into a car.  Patient fell onto her left side and is complaining of pain to the left chest wall and headache.  Will get a CT scan of the chest to rule out rib fractures.  Will get a CT of the head and neck to evaluate for fracture or intracranial injury.  Will treat the pain and reassess.

## 2024-09-07 NOTE — ED PROVIDER NOTE - NSFOLLOWUPINSTRUCTIONS_ED_ALL_ED_FT
-- Your imaging today showed no acute fractures.   -- please take tylenol 625mg every 6 hours as needed for pain.   -- Please follow-up with your doctor(s) within the next 3 days, but seek medical attention sooner if your symptoms persist or worsen.  Please call tomorrow for an appointment.  If you cannot follow-up with your primary doctor please return to the ED for any urgent issues.  -- You were given a copy of your labs and imaging.  Please go-over these with your doctor(s).   -- If you have any worsening of symptoms or any other concerns please see your doctor or return to the ED immediately.    Contusion    A contusion is a deep bruise. Contusions are the result of a blunt injury to tissues and muscle fibers under the skin. The skin overlying the contusion may turn blue, purple, or yellow. Symptoms also include pain and swelling in the injured area.    SEEK IMMEDIATE MEDICAL CARE IF YOU HAVE ANY OF THE FOLLOWING SYMPTOMS: severe pain, numbness, tingling, pain, weakness, or skin color/temperature change in any part of your body distal to the injury.

## 2024-09-07 NOTE — ED ADULT NURSE NOTE - NSFALLRISKINTERV_ED_ALL_ED

## 2024-09-08 VITALS
HEART RATE: 64 BPM | TEMPERATURE: 98 F | SYSTOLIC BLOOD PRESSURE: 110 MMHG | RESPIRATION RATE: 18 BRPM | DIASTOLIC BLOOD PRESSURE: 75 MMHG | OXYGEN SATURATION: 98 %

## 2025-09-09 ENCOUNTER — APPOINTMENT (OUTPATIENT)
Dept: PHYSICAL MEDICINE AND REHAB | Facility: CLINIC | Age: 75
End: 2025-09-09
Payer: MEDICARE

## 2025-09-09 PROCEDURE — 99214 OFFICE O/P EST MOD 30 MIN: CPT

## 2025-09-09 RX ORDER — METHYLPREDNISOLONE 4 MG/1
4 TABLET ORAL
Qty: 1 | Refills: 0 | Status: ACTIVE | COMMUNITY
Start: 2025-09-09 | End: 1900-01-01

## 2025-09-15 ENCOUNTER — APPOINTMENT (OUTPATIENT)
Dept: PHYSICAL MEDICINE AND REHAB | Facility: CLINIC | Age: 75
End: 2025-09-15
Payer: MEDICARE

## 2025-09-15 DIAGNOSIS — M51.26 OTHER INTERVERTEBRAL DISC DISPLACEMENT, LUMBAR REGION: ICD-10-CM

## 2025-09-15 DIAGNOSIS — M54.16 RADICULOPATHY, LUMBAR REGION: ICD-10-CM

## 2025-09-15 DIAGNOSIS — M51.369: ICD-10-CM

## 2025-09-15 DIAGNOSIS — M43.16 SPONDYLOLISTHESIS, LUMBAR REGION: ICD-10-CM

## 2025-09-15 PROCEDURE — 62323 NJX INTERLAMINAR LMBR/SAC: CPT
